# Patient Record
Sex: MALE | Race: BLACK OR AFRICAN AMERICAN | NOT HISPANIC OR LATINO | URBAN - METROPOLITAN AREA
[De-identification: names, ages, dates, MRNs, and addresses within clinical notes are randomized per-mention and may not be internally consistent; named-entity substitution may affect disease eponyms.]

---

## 2018-07-17 ENCOUNTER — INPATIENT (INPATIENT)
Facility: HOSPITAL | Age: 58
LOS: 6 days | Discharge: ANOTHER HOSPITAL | DRG: 917 | End: 2018-07-24
Attending: HOSPITALIST | Admitting: INTERNAL MEDICINE
Payer: OTHER GOVERNMENT

## 2018-07-17 VITALS
HEART RATE: 103 BPM | HEIGHT: 73 IN | OXYGEN SATURATION: 96 % | WEIGHT: 250 LBS | DIASTOLIC BLOOD PRESSURE: 44 MMHG | SYSTOLIC BLOOD PRESSURE: 78 MMHG | RESPIRATION RATE: 24 BRPM

## 2018-07-17 LAB
BASOPHILS # BLD AUTO: 0 K/UL — SIGNIFICANT CHANGE UP (ref 0–0.2)
BASOPHILS NFR BLD AUTO: 0.4 % — SIGNIFICANT CHANGE UP (ref 0–2)
EOSINOPHIL # BLD AUTO: 0.2 K/UL — SIGNIFICANT CHANGE UP (ref 0–0.5)
EOSINOPHIL NFR BLD AUTO: 1.5 % — SIGNIFICANT CHANGE UP (ref 0–6)
HCT VFR BLD CALC: 43.8 % — SIGNIFICANT CHANGE UP (ref 42–52)
HGB BLD-MCNC: 15 G/DL — SIGNIFICANT CHANGE UP (ref 14–18)
LYMPHOCYTES # BLD AUTO: 2.5 K/UL — SIGNIFICANT CHANGE UP (ref 1–4.8)
LYMPHOCYTES # BLD AUTO: 24.3 % — SIGNIFICANT CHANGE UP (ref 20–55)
MCHC RBC-ENTMCNC: 29.2 PG — SIGNIFICANT CHANGE UP (ref 27–31)
MCHC RBC-ENTMCNC: 34.2 G/DL — SIGNIFICANT CHANGE UP (ref 32–36)
MCV RBC AUTO: 85.4 FL — SIGNIFICANT CHANGE UP (ref 80–94)
MONOCYTES # BLD AUTO: 0.8 K/UL — SIGNIFICANT CHANGE UP (ref 0–0.8)
MONOCYTES NFR BLD AUTO: 8.2 % — SIGNIFICANT CHANGE UP (ref 3–10)
NEUTROPHILS # BLD AUTO: 6.8 K/UL — SIGNIFICANT CHANGE UP (ref 1.8–8)
NEUTROPHILS NFR BLD AUTO: 65.1 % — SIGNIFICANT CHANGE UP (ref 37–73)
PLATELET # BLD AUTO: 182 K/UL — SIGNIFICANT CHANGE UP (ref 150–400)
RBC # BLD: 5.13 M/UL — SIGNIFICANT CHANGE UP (ref 4.6–6.2)
RBC # FLD: 12.8 % — SIGNIFICANT CHANGE UP (ref 11–15.6)
WBC # BLD: 10.4 K/UL — SIGNIFICANT CHANGE UP (ref 4.8–10.8)
WBC # FLD AUTO: 10.4 K/UL — SIGNIFICANT CHANGE UP (ref 4.8–10.8)

## 2018-07-17 PROCEDURE — 36556 INSERT NON-TUNNEL CV CATH: CPT

## 2018-07-17 PROCEDURE — 99291 CRITICAL CARE FIRST HOUR: CPT | Mod: 25

## 2018-07-17 PROCEDURE — 99053 MED SERV 10PM-8AM 24 HR FAC: CPT

## 2018-07-17 PROCEDURE — 62270 DX LMBR SPI PNXR: CPT

## 2018-07-17 PROCEDURE — 99292 CRITICAL CARE ADDL 30 MIN: CPT | Mod: 25

## 2018-07-17 PROCEDURE — 93010 ELECTROCARDIOGRAM REPORT: CPT

## 2018-07-17 RX ORDER — SODIUM CHLORIDE 9 MG/ML
2000 INJECTION INTRAMUSCULAR; INTRAVENOUS; SUBCUTANEOUS ONCE
Qty: 0 | Refills: 0 | Status: COMPLETED | OUTPATIENT
Start: 2018-07-17 | End: 2018-07-17

## 2018-07-17 RX ORDER — NOREPINEPHRINE BITARTRATE/D5W 8 MG/250ML
0.05 PLASTIC BAG, INJECTION (ML) INTRAVENOUS
Qty: 8 | Refills: 0 | Status: DISCONTINUED | OUTPATIENT
Start: 2018-07-17 | End: 2018-07-19

## 2018-07-17 RX ORDER — CALCIUM CHLORIDE
1000 POWDER (GRAM) MISCELLANEOUS ONCE
Qty: 0 | Refills: 0 | Status: COMPLETED | OUTPATIENT
Start: 2018-07-17 | End: 2018-07-17

## 2018-07-17 RX ORDER — GLUCAGON INJECTION, SOLUTION 0.5 MG/.1ML
1 INJECTION, SOLUTION SUBCUTANEOUS ONCE
Qty: 0 | Refills: 0 | Status: COMPLETED | OUTPATIENT
Start: 2018-07-17 | End: 2018-07-17

## 2018-07-17 RX ADMIN — Medication 1000 MILLIGRAM(S): at 23:41

## 2018-07-17 RX ADMIN — GLUCAGON INJECTION, SOLUTION 1 MILLIGRAM(S): 0.5 INJECTION, SOLUTION SUBCUTANEOUS at 23:46

## 2018-07-17 RX ADMIN — Medication 10.63 MICROGRAM(S)/KG/MIN: at 23:53

## 2018-07-17 RX ADMIN — SODIUM CHLORIDE 4000 MILLILITER(S): 9 INJECTION INTRAMUSCULAR; INTRAVENOUS; SUBCUTANEOUS at 23:41

## 2018-07-17 NOTE — ED PROVIDER NOTE - CONSTITUTIONAL, MLM
normal... Patient is ill appearing, decreased mentation, slow to respond, arousalable to verbal stimuli.

## 2018-07-17 NOTE — ED PROVIDER NOTE - CARE PLAN
Principal Discharge DX:	Calcium channel blocker overdose  Secondary Diagnosis:	Shock  Secondary Diagnosis:	Hypotension

## 2018-07-17 NOTE — ED PROVIDER NOTE - MUSCULOSKELETAL, MLM
Spine appears normal, range of motion is not limited, no muscle or joint tenderness. Moves all extremities but slowly.

## 2018-07-17 NOTE — ED ADULT TRIAGE NOTE - CHIEF COMPLAINT QUOTE
"I took 2x dozen of my angina pills" "I wanted to hurt myself", BIBA c/o intentional overdose, per EMS pt was found outside leaning against his car. RN observes pt to be hypotensive on arrival, awake and talking. MD Goodwin called to bedside with code team "I took 2x dozen of my angina pills" "I wanted to hurt myself", BIBA c/o intentional overdose, per EMS pt was found outside leaning against his car, + suicide attempt per pt. Arrives with 15L/min NRB in place by EMS, RN observes pt to be hypotensive on arrival, awake and talking. Skin pale and diaphoretic, MD Goodwin called to bedside with code team. Name of medication taken unknown @ this time

## 2018-07-17 NOTE — ED PROVIDER NOTE - OBJECTIVE STATEMENT
57 y/o M presents to ED c/o sudden onset of suicidal attempt and overdose today while at home. Currently in ED patient is admitting to taking half a bottle of Glipizide and 2 bottles (approximately 30 pills in each) of his Amlodipine. Patient currently states he feels like he cannot breath.     As per wife, before the patient took the pills, they were having a verbal altercation. Upon walking downstairs, she noticed he was slumped over in the chair and was not as responsive as he normally is. At home, he admitted to taking "a couple dozen" of his prescribed medications and that he did so to harm himself, so the wife activated EMS. At home, the patient has access to Metformin, Glipizide, Lisinopril and Amlodipine. The patient does not have a history of psychiatric problems, or past suicide attempts.

## 2018-07-17 NOTE — ED PROVIDER NOTE - UNABLE TO OBTAIN
History is limited due to suspected overdose Severe Illness/Injury Cannot obtain complete ROS due to suspected overdose

## 2018-07-17 NOTE — ED PROVIDER NOTE - PROGRESS NOTE DETAILS
Spoke with toxicology fellow, following their current recommendations. EMS arrived with patient's medications that they retrieved from the home and had the following:   Isosorbide mononitrate, 1 empty bottle which was filled on 7/21/17, 3 month supply, Metformin bottles (several unopened, half filled and empty bottles), Glipizide 10, Ibuprofen, Lipitor, Several bottles of probiotics and vitamins, Aspirin 81, Magnesium EMS arrived with patient's medications that they retrieved from the home and had the following:   Isosorbide mononitrate (1 empty bottle which was filled on 7/21/17, 3 month supply), Metformin (several bottles, some unopened, half filled and empty), Glipizide 10, Ibuprofen, Lipitor, several bottles of probiotics and vitamins, Aspirin 81 and Magnesium. Although the patient did not have Amlodipine in the bag the wife states "I am 2000% sure that he has a prescription for Amlodipine". Patient also admits for a second time that he did take a significant amount of the Amlodipine. Toxicology fellow (Yasmany Estrada) gave the following recommendation:  100 unit regular insulin IV push, 100 units insulin drip per hour, 2 amp push of D50 (prior to insulin), D10 half normal at 140 per hour, titrate sugar q15 minutes to 100 Attempted to decrease Levophed drip to 0.05 mcg/kg/min, systolic BP was 80, will keep at 1 mcg/kg/min Repeat finger stick 391. Although the patient did not have Amlodipine in the bag the wife states "I am 2000% sure that he has a prescription for Amlodipine". Patient also admits for a second time that he did take a significant amount of the Amlodipine (approximately 50 pills). All recommendations implemented from toxicology fellow. ICU coming down to ED to evaluated patient. BP improving on Levophed and insulin drip, will continue to monitor closely.

## 2018-07-18 DIAGNOSIS — T38.3X1A POISONING BY INSULIN AND ORAL HYPOGLYCEMIC [ANTIDIABETIC] DRUGS, ACCIDENTAL (UNINTENTIONAL), INITIAL ENCOUNTER: ICD-10-CM

## 2018-07-18 DIAGNOSIS — T14.91XA SUICIDE ATTEMPT, INITIAL ENCOUNTER: ICD-10-CM

## 2018-07-18 DIAGNOSIS — T46.1X1A POISONING BY CALCIUM-CHANNEL BLOCKERS, ACCIDENTAL (UNINTENTIONAL), INITIAL ENCOUNTER: ICD-10-CM

## 2018-07-18 DIAGNOSIS — N17.9 ACUTE KIDNEY FAILURE, UNSPECIFIED: ICD-10-CM

## 2018-07-18 DIAGNOSIS — F32.9 MAJOR DEPRESSIVE DISORDER, SINGLE EPISODE, UNSPECIFIED: ICD-10-CM

## 2018-07-18 DIAGNOSIS — I10 ESSENTIAL (PRIMARY) HYPERTENSION: ICD-10-CM

## 2018-07-18 DIAGNOSIS — E11.9 TYPE 2 DIABETES MELLITUS WITHOUT COMPLICATIONS: ICD-10-CM

## 2018-07-18 DIAGNOSIS — R57.9 SHOCK, UNSPECIFIED: ICD-10-CM

## 2018-07-18 LAB
ALBUMIN SERPL ELPH-MCNC: 2.9 G/DL — LOW (ref 3.3–5.2)
ALBUMIN SERPL ELPH-MCNC: 3.1 G/DL — LOW (ref 3.3–5.2)
ALBUMIN SERPL ELPH-MCNC: 3.1 G/DL — LOW (ref 3.3–5.2)
ALBUMIN SERPL ELPH-MCNC: 3.6 G/DL — SIGNIFICANT CHANGE UP (ref 3.3–5.2)
ALBUMIN SERPL ELPH-MCNC: 3.9 G/DL — SIGNIFICANT CHANGE UP (ref 3.3–5.2)
ALP SERPL-CCNC: 36 U/L — LOW (ref 40–120)
ALP SERPL-CCNC: 37 U/L — LOW (ref 40–120)
ALP SERPL-CCNC: 37 U/L — LOW (ref 40–120)
ALP SERPL-CCNC: 50 U/L — SIGNIFICANT CHANGE UP (ref 40–120)
ALP SERPL-CCNC: 61 U/L — SIGNIFICANT CHANGE UP (ref 40–120)
ALT FLD-CCNC: 27 U/L — SIGNIFICANT CHANGE UP
ALT FLD-CCNC: 31 U/L — SIGNIFICANT CHANGE UP
ALT FLD-CCNC: 32 U/L — SIGNIFICANT CHANGE UP
ALT FLD-CCNC: 32 U/L — SIGNIFICANT CHANGE UP
ALT FLD-CCNC: 36 U/L — SIGNIFICANT CHANGE UP
ANION GAP SERPL CALC-SCNC: 13 MMOL/L — SIGNIFICANT CHANGE UP (ref 5–17)
ANION GAP SERPL CALC-SCNC: 14 MMOL/L — SIGNIFICANT CHANGE UP (ref 5–17)
ANION GAP SERPL CALC-SCNC: 16 MMOL/L — SIGNIFICANT CHANGE UP (ref 5–17)
ANION GAP SERPL CALC-SCNC: 18 MMOL/L — HIGH (ref 5–17)
ANION GAP SERPL CALC-SCNC: 22 MMOL/L — HIGH (ref 5–17)
APAP SERPL-MCNC: <7.5 UG/ML — LOW (ref 10–26)
AST SERPL-CCNC: 28 U/L — SIGNIFICANT CHANGE UP
AST SERPL-CCNC: 30 U/L — SIGNIFICANT CHANGE UP
AST SERPL-CCNC: 31 U/L — SIGNIFICANT CHANGE UP
AST SERPL-CCNC: 34 U/L — SIGNIFICANT CHANGE UP
AST SERPL-CCNC: 34 U/L — SIGNIFICANT CHANGE UP
BILIRUB SERPL-MCNC: 0.9 MG/DL — SIGNIFICANT CHANGE UP (ref 0.4–2)
BILIRUB SERPL-MCNC: 1 MG/DL — SIGNIFICANT CHANGE UP (ref 0.4–2)
BILIRUB SERPL-MCNC: 1 MG/DL — SIGNIFICANT CHANGE UP (ref 0.4–2)
BUN SERPL-MCNC: 15 MG/DL — SIGNIFICANT CHANGE UP (ref 8–20)
BUN SERPL-MCNC: 17 MG/DL — SIGNIFICANT CHANGE UP (ref 8–20)
BUN SERPL-MCNC: 17 MG/DL — SIGNIFICANT CHANGE UP (ref 8–20)
BUN SERPL-MCNC: 19 MG/DL — SIGNIFICANT CHANGE UP (ref 8–20)
BUN SERPL-MCNC: 22 MG/DL — HIGH (ref 8–20)
CALCIUM SERPL-MCNC: 7.1 MG/DL — LOW (ref 8.6–10.2)
CALCIUM SERPL-MCNC: 7.7 MG/DL — LOW (ref 8.6–10.2)
CALCIUM SERPL-MCNC: 7.8 MG/DL — LOW (ref 8.6–10.2)
CALCIUM SERPL-MCNC: 9.1 MG/DL — SIGNIFICANT CHANGE UP (ref 8.6–10.2)
CALCIUM SERPL-MCNC: 9.5 MG/DL — SIGNIFICANT CHANGE UP (ref 8.6–10.2)
CHLORIDE SERPL-SCNC: 100 MMOL/L — SIGNIFICANT CHANGE UP (ref 98–107)
CHLORIDE SERPL-SCNC: 103 MMOL/L — SIGNIFICANT CHANGE UP (ref 98–107)
CHLORIDE SERPL-SCNC: 103 MMOL/L — SIGNIFICANT CHANGE UP (ref 98–107)
CHLORIDE SERPL-SCNC: 105 MMOL/L — SIGNIFICANT CHANGE UP (ref 98–107)
CHLORIDE SERPL-SCNC: 109 MMOL/L — HIGH (ref 98–107)
CK MB CFR SERPL CALC: 5.8 NG/ML — SIGNIFICANT CHANGE UP (ref 0–6.7)
CK SERPL-CCNC: 669 U/L — HIGH (ref 30–200)
CO2 SERPL-SCNC: 17 MMOL/L — LOW (ref 22–29)
CO2 SERPL-SCNC: 19 MMOL/L — LOW (ref 22–29)
CO2 SERPL-SCNC: 20 MMOL/L — LOW (ref 22–29)
CO2 SERPL-SCNC: 20 MMOL/L — LOW (ref 22–29)
CO2 SERPL-SCNC: 21 MMOL/L — LOW (ref 22–29)
CREAT SERPL-MCNC: 1.15 MG/DL — SIGNIFICANT CHANGE UP (ref 0.5–1.3)
CREAT SERPL-MCNC: 1.23 MG/DL — SIGNIFICANT CHANGE UP (ref 0.5–1.3)
CREAT SERPL-MCNC: 1.28 MG/DL — SIGNIFICANT CHANGE UP (ref 0.5–1.3)
CREAT SERPL-MCNC: 1.4 MG/DL — HIGH (ref 0.5–1.3)
CREAT SERPL-MCNC: 1.87 MG/DL — HIGH (ref 0.5–1.3)
ETHANOL SERPL-MCNC: <10 MG/DL — SIGNIFICANT CHANGE UP
GAS PNL BLDA: SIGNIFICANT CHANGE UP
GLUCOSE BLDC GLUCOMTR-MCNC: 101 MG/DL — HIGH (ref 70–99)
GLUCOSE BLDC GLUCOMTR-MCNC: 101 MG/DL — HIGH (ref 70–99)
GLUCOSE BLDC GLUCOMTR-MCNC: 103 MG/DL — HIGH (ref 70–99)
GLUCOSE BLDC GLUCOMTR-MCNC: 104 MG/DL — HIGH (ref 70–99)
GLUCOSE BLDC GLUCOMTR-MCNC: 106 MG/DL — HIGH (ref 70–99)
GLUCOSE BLDC GLUCOMTR-MCNC: 107 MG/DL — HIGH (ref 70–99)
GLUCOSE BLDC GLUCOMTR-MCNC: 111 MG/DL — HIGH (ref 70–99)
GLUCOSE BLDC GLUCOMTR-MCNC: 112 MG/DL — HIGH (ref 70–99)
GLUCOSE BLDC GLUCOMTR-MCNC: 112 MG/DL — HIGH (ref 70–99)
GLUCOSE BLDC GLUCOMTR-MCNC: 119 MG/DL — HIGH (ref 70–99)
GLUCOSE BLDC GLUCOMTR-MCNC: 121 MG/DL — HIGH (ref 70–99)
GLUCOSE BLDC GLUCOMTR-MCNC: 122 MG/DL — HIGH (ref 70–99)
GLUCOSE BLDC GLUCOMTR-MCNC: 127 MG/DL — HIGH (ref 70–99)
GLUCOSE BLDC GLUCOMTR-MCNC: 128 MG/DL — HIGH (ref 70–99)
GLUCOSE BLDC GLUCOMTR-MCNC: 129 MG/DL — HIGH (ref 70–99)
GLUCOSE BLDC GLUCOMTR-MCNC: 129 MG/DL — HIGH (ref 70–99)
GLUCOSE BLDC GLUCOMTR-MCNC: 136 MG/DL — HIGH (ref 70–99)
GLUCOSE BLDC GLUCOMTR-MCNC: 137 MG/DL — HIGH (ref 70–99)
GLUCOSE BLDC GLUCOMTR-MCNC: 141 MG/DL — HIGH (ref 70–99)
GLUCOSE BLDC GLUCOMTR-MCNC: 146 MG/DL — HIGH (ref 70–99)
GLUCOSE BLDC GLUCOMTR-MCNC: 147 MG/DL — HIGH (ref 70–99)
GLUCOSE BLDC GLUCOMTR-MCNC: 149 MG/DL — HIGH (ref 70–99)
GLUCOSE BLDC GLUCOMTR-MCNC: 151 MG/DL — HIGH (ref 70–99)
GLUCOSE BLDC GLUCOMTR-MCNC: 155 MG/DL — HIGH (ref 70–99)
GLUCOSE BLDC GLUCOMTR-MCNC: 183 MG/DL — HIGH (ref 70–99)
GLUCOSE BLDC GLUCOMTR-MCNC: 198 MG/DL — HIGH (ref 70–99)
GLUCOSE BLDC GLUCOMTR-MCNC: 200 MG/DL — HIGH (ref 70–99)
GLUCOSE BLDC GLUCOMTR-MCNC: 221 MG/DL — HIGH (ref 70–99)
GLUCOSE BLDC GLUCOMTR-MCNC: 229 MG/DL — HIGH (ref 70–99)
GLUCOSE BLDC GLUCOMTR-MCNC: 297 MG/DL — HIGH (ref 70–99)
GLUCOSE BLDC GLUCOMTR-MCNC: 309 MG/DL — HIGH (ref 70–99)
GLUCOSE BLDC GLUCOMTR-MCNC: 34 MG/DL — CRITICAL LOW (ref 70–99)
GLUCOSE BLDC GLUCOMTR-MCNC: 41 MG/DL — CRITICAL LOW (ref 70–99)
GLUCOSE BLDC GLUCOMTR-MCNC: 56 MG/DL — LOW (ref 70–99)
GLUCOSE BLDC GLUCOMTR-MCNC: 63 MG/DL — LOW (ref 70–99)
GLUCOSE BLDC GLUCOMTR-MCNC: 75 MG/DL — SIGNIFICANT CHANGE UP (ref 70–99)
GLUCOSE BLDC GLUCOMTR-MCNC: 76 MG/DL — SIGNIFICANT CHANGE UP (ref 70–99)
GLUCOSE BLDC GLUCOMTR-MCNC: 81 MG/DL — SIGNIFICANT CHANGE UP (ref 70–99)
GLUCOSE BLDC GLUCOMTR-MCNC: 81 MG/DL — SIGNIFICANT CHANGE UP (ref 70–99)
GLUCOSE BLDC GLUCOMTR-MCNC: 83 MG/DL — SIGNIFICANT CHANGE UP (ref 70–99)
GLUCOSE BLDC GLUCOMTR-MCNC: 85 MG/DL — SIGNIFICANT CHANGE UP (ref 70–99)
GLUCOSE BLDC GLUCOMTR-MCNC: 86 MG/DL — SIGNIFICANT CHANGE UP (ref 70–99)
GLUCOSE BLDC GLUCOMTR-MCNC: 88 MG/DL — SIGNIFICANT CHANGE UP (ref 70–99)
GLUCOSE BLDC GLUCOMTR-MCNC: 97 MG/DL — SIGNIFICANT CHANGE UP (ref 70–99)
GLUCOSE BLDC GLUCOMTR-MCNC: 98 MG/DL — SIGNIFICANT CHANGE UP (ref 70–99)
GLUCOSE BLDC GLUCOMTR-MCNC: <30 MG/DL — CRITICAL LOW (ref 70–99)
GLUCOSE BLDC GLUCOMTR-MCNC: >530 MG/DL — CRITICAL HIGH (ref 70–99)
GLUCOSE SERPL-MCNC: 130 MG/DL — HIGH (ref 70–115)
GLUCOSE SERPL-MCNC: 132 MG/DL — HIGH (ref 70–115)
GLUCOSE SERPL-MCNC: 229 MG/DL — HIGH (ref 70–115)
GLUCOSE SERPL-MCNC: 274 MG/DL — HIGH (ref 70–115)
GLUCOSE SERPL-MCNC: 63 MG/DL — LOW (ref 70–115)
HCT VFR BLD CALC: 39 % — LOW (ref 42–52)
HGB BLD-MCNC: 13.4 G/DL — LOW (ref 14–18)
INR BLD: 1.15 RATIO — SIGNIFICANT CHANGE UP (ref 0.88–1.16)
LACTATE BLDV-MCNC: 1.7 MMOL/L — SIGNIFICANT CHANGE UP (ref 0.5–2)
LACTATE BLDV-MCNC: 1.8 MMOL/L — SIGNIFICANT CHANGE UP (ref 0.5–2)
LACTATE BLDV-MCNC: 2.5 MMOL/L — HIGH (ref 0.5–2)
LACTATE BLDV-MCNC: 2.5 MMOL/L — HIGH (ref 0.5–2)
LACTATE SERPL-SCNC: 6 MMOL/L — CRITICAL HIGH (ref 0.5–2)
MAGNESIUM SERPL-MCNC: 1.6 MG/DL — SIGNIFICANT CHANGE UP (ref 1.6–2.6)
MAGNESIUM SERPL-MCNC: 1.8 MG/DL — SIGNIFICANT CHANGE UP (ref 1.6–2.6)
MAGNESIUM SERPL-MCNC: 2.3 MG/DL — SIGNIFICANT CHANGE UP (ref 1.6–2.6)
MAGNESIUM SERPL-MCNC: 2.4 MG/DL — SIGNIFICANT CHANGE UP (ref 1.6–2.6)
MCHC RBC-ENTMCNC: 28.9 PG — SIGNIFICANT CHANGE UP (ref 27–31)
MCHC RBC-ENTMCNC: 34.4 G/DL — SIGNIFICANT CHANGE UP (ref 32–36)
MCV RBC AUTO: 84.2 FL — SIGNIFICANT CHANGE UP (ref 80–94)
PHOSPHATE SERPL-MCNC: 0.9 MG/DL — CRITICAL LOW (ref 2.4–4.7)
PHOSPHATE SERPL-MCNC: 2.6 MG/DL — SIGNIFICANT CHANGE UP (ref 2.4–4.7)
PHOSPHATE SERPL-MCNC: 3.4 MG/DL — SIGNIFICANT CHANGE UP (ref 2.4–4.7)
PHOSPHATE SERPL-MCNC: 3.4 MG/DL — SIGNIFICANT CHANGE UP (ref 2.4–4.7)
PLATELET # BLD AUTO: 208 K/UL — SIGNIFICANT CHANGE UP (ref 150–400)
POTASSIUM SERPL-MCNC: 3 MMOL/L — LOW (ref 3.5–5.3)
POTASSIUM SERPL-MCNC: 3.1 MMOL/L — LOW (ref 3.5–5.3)
POTASSIUM SERPL-MCNC: 3.3 MMOL/L — LOW (ref 3.5–5.3)
POTASSIUM SERPL-MCNC: 3.6 MMOL/L — SIGNIFICANT CHANGE UP (ref 3.5–5.3)
POTASSIUM SERPL-MCNC: 3.9 MMOL/L — SIGNIFICANT CHANGE UP (ref 3.5–5.3)
POTASSIUM SERPL-SCNC: 3 MMOL/L — LOW (ref 3.5–5.3)
POTASSIUM SERPL-SCNC: 3.1 MMOL/L — LOW (ref 3.5–5.3)
POTASSIUM SERPL-SCNC: 3.3 MMOL/L — LOW (ref 3.5–5.3)
POTASSIUM SERPL-SCNC: 3.6 MMOL/L — SIGNIFICANT CHANGE UP (ref 3.5–5.3)
POTASSIUM SERPL-SCNC: 3.9 MMOL/L — SIGNIFICANT CHANGE UP (ref 3.5–5.3)
PROT SERPL-MCNC: 5 G/DL — LOW (ref 6.6–8.7)
PROT SERPL-MCNC: 5.3 G/DL — LOW (ref 6.6–8.7)
PROT SERPL-MCNC: 5.4 G/DL — LOW (ref 6.6–8.7)
PROT SERPL-MCNC: 6.3 G/DL — LOW (ref 6.6–8.7)
PROT SERPL-MCNC: 6.9 G/DL — SIGNIFICANT CHANGE UP (ref 6.6–8.7)
PROTHROM AB SERPL-ACNC: 12.7 SEC — SIGNIFICANT CHANGE UP (ref 9.8–12.7)
RBC # BLD: 4.63 M/UL — SIGNIFICANT CHANGE UP (ref 4.6–6.2)
RBC # FLD: 12.7 % — SIGNIFICANT CHANGE UP (ref 11–15.6)
SALICYLATES SERPL-MCNC: <0.6 MG/DL — LOW (ref 10–20)
SODIUM SERPL-SCNC: 138 MMOL/L — SIGNIFICANT CHANGE UP (ref 135–145)
SODIUM SERPL-SCNC: 139 MMOL/L — SIGNIFICANT CHANGE UP (ref 135–145)
SODIUM SERPL-SCNC: 139 MMOL/L — SIGNIFICANT CHANGE UP (ref 135–145)
SODIUM SERPL-SCNC: 142 MMOL/L — SIGNIFICANT CHANGE UP (ref 135–145)
SODIUM SERPL-SCNC: 142 MMOL/L — SIGNIFICANT CHANGE UP (ref 135–145)
TSH SERPL-MCNC: 3.81 UIU/ML — SIGNIFICANT CHANGE UP (ref 0.27–4.2)
WBC # BLD: 17.1 K/UL — HIGH (ref 4.8–10.8)
WBC # FLD AUTO: 17.1 K/UL — HIGH (ref 4.8–10.8)

## 2018-07-18 PROCEDURE — 71045 X-RAY EXAM CHEST 1 VIEW: CPT | Mod: 26

## 2018-07-18 PROCEDURE — 99291 CRITICAL CARE FIRST HOUR: CPT

## 2018-07-18 PROCEDURE — 99223 1ST HOSP IP/OBS HIGH 75: CPT

## 2018-07-18 RX ORDER — INSULIN HUMAN 100 [IU]/ML
100 INJECTION, SOLUTION SUBCUTANEOUS
Qty: 100 | Refills: 0 | Status: DISCONTINUED | OUTPATIENT
Start: 2018-07-18 | End: 2018-07-18

## 2018-07-18 RX ORDER — ISOSORBIDE MONONITRATE 60 MG/1
30 TABLET, EXTENDED RELEASE ORAL
Qty: 0 | Refills: 0 | COMMUNITY

## 2018-07-18 RX ORDER — ASPIRIN/CALCIUM CARB/MAGNESIUM 324 MG
1 TABLET ORAL
Qty: 0 | Refills: 0 | COMMUNITY

## 2018-07-18 RX ORDER — DEXTROSE 50 % IN WATER 50 %
25 SYRINGE (ML) INTRAVENOUS ONCE
Qty: 0 | Refills: 0 | Status: COMPLETED | OUTPATIENT
Start: 2018-07-18 | End: 2018-07-18

## 2018-07-18 RX ORDER — METFORMIN HYDROCHLORIDE 850 MG/1
0 TABLET ORAL
Qty: 0 | Refills: 0 | COMMUNITY

## 2018-07-18 RX ORDER — POTASSIUM PHOSPHATE, MONOBASIC POTASSIUM PHOSPHATE, DIBASIC 236; 224 MG/ML; MG/ML
30 INJECTION, SOLUTION INTRAVENOUS ONCE
Qty: 0 | Refills: 0 | Status: COMPLETED | OUTPATIENT
Start: 2018-07-18 | End: 2018-07-18

## 2018-07-18 RX ORDER — ERGOCALCIFEROL 1.25 MG/1
1 CAPSULE ORAL
Qty: 0 | Refills: 0 | COMMUNITY

## 2018-07-18 RX ORDER — DEXTROSE 50 % IN WATER 50 %
50 SYRINGE (ML) INTRAVENOUS ONCE
Qty: 0 | Refills: 0 | Status: COMPLETED | OUTPATIENT
Start: 2018-07-18 | End: 2018-07-18

## 2018-07-18 RX ORDER — ACETAMINOPHEN 500 MG
650 TABLET ORAL ONCE
Qty: 0 | Refills: 0 | Status: COMPLETED | OUTPATIENT
Start: 2018-07-18 | End: 2018-07-18

## 2018-07-18 RX ORDER — ATORVASTATIN CALCIUM 80 MG/1
1 TABLET, FILM COATED ORAL
Qty: 0 | Refills: 0 | COMMUNITY

## 2018-07-18 RX ORDER — INSULIN HUMAN 100 [IU]/ML
100 INJECTION, SOLUTION SUBCUTANEOUS ONCE
Qty: 0 | Refills: 0 | Status: COMPLETED | OUTPATIENT
Start: 2018-07-18 | End: 2018-07-18

## 2018-07-18 RX ORDER — INSULIN HUMAN 100 [IU]/ML
50 INJECTION, SOLUTION SUBCUTANEOUS
Qty: 250 | Refills: 0 | Status: DISCONTINUED | OUTPATIENT
Start: 2018-07-18 | End: 2018-07-19

## 2018-07-18 RX ORDER — MAGNESIUM SULFATE 500 MG/ML
2 VIAL (ML) INJECTION
Qty: 0 | Refills: 0 | Status: COMPLETED | OUTPATIENT
Start: 2018-07-18 | End: 2018-07-18

## 2018-07-18 RX ORDER — HEPARIN SODIUM 5000 [USP'U]/ML
5000 INJECTION INTRAVENOUS; SUBCUTANEOUS EVERY 8 HOURS
Qty: 0 | Refills: 0 | Status: DISCONTINUED | OUTPATIENT
Start: 2018-07-18 | End: 2018-07-24

## 2018-07-18 RX ORDER — SODIUM CHLORIDE 9 MG/ML
1000 INJECTION, SOLUTION INTRAVENOUS
Qty: 0 | Refills: 0 | Status: DISCONTINUED | OUTPATIENT
Start: 2018-07-18 | End: 2018-07-19

## 2018-07-18 RX ORDER — MENTHOL AND METHYL SALICYLATE 10; 30 G/100G; G/100G
1 STICK TOPICAL THREE TIMES A DAY
Qty: 0 | Refills: 0 | Status: DISCONTINUED | OUTPATIENT
Start: 2018-07-18 | End: 2018-07-24

## 2018-07-18 RX ORDER — SODIUM CHLORIDE 9 MG/ML
1000 INJECTION, SOLUTION INTRAVENOUS ONCE
Qty: 0 | Refills: 0 | Status: COMPLETED | OUTPATIENT
Start: 2018-07-18 | End: 2018-07-18

## 2018-07-18 RX ORDER — VASOPRESSIN 20 [USP'U]/ML
0.04 INJECTION INTRAVENOUS
Qty: 100 | Refills: 0 | Status: DISCONTINUED | OUTPATIENT
Start: 2018-07-18 | End: 2018-07-19

## 2018-07-18 RX ADMIN — Medication 650 MILLIGRAM(S): at 20:47

## 2018-07-18 RX ADMIN — INSULIN HUMAN 100 UNIT(S): 100 INJECTION, SOLUTION SUBCUTANEOUS at 00:42

## 2018-07-18 RX ADMIN — Medication 85 MILLIMOLE(S): at 12:44

## 2018-07-18 RX ADMIN — VASOPRESSIN 2.4 UNIT(S)/MIN: 20 INJECTION INTRAVENOUS at 03:15

## 2018-07-18 RX ADMIN — Medication 25 MILLILITER(S): at 09:00

## 2018-07-18 RX ADMIN — Medication 50 GRAM(S): at 15:52

## 2018-07-18 RX ADMIN — Medication 25 MILLILITER(S): at 12:30

## 2018-07-18 RX ADMIN — INSULIN HUMAN 100 UNIT(S)/HR: 100 INJECTION, SOLUTION SUBCUTANEOUS at 03:30

## 2018-07-18 RX ADMIN — INSULIN HUMAN 50 UNIT(S)/HR: 100 INJECTION, SOLUTION SUBCUTANEOUS at 23:46

## 2018-07-18 RX ADMIN — INSULIN HUMAN 50 UNIT(S)/HR: 100 INJECTION, SOLUTION SUBCUTANEOUS at 17:56

## 2018-07-18 RX ADMIN — Medication 50 MILLILITER(S): at 00:35

## 2018-07-18 RX ADMIN — Medication 50 MILLILITER(S): at 14:10

## 2018-07-18 RX ADMIN — SODIUM CHLORIDE 140 MILLILITER(S): 9 INJECTION, SOLUTION INTRAVENOUS at 00:40

## 2018-07-18 RX ADMIN — Medication 10.63 MICROGRAM(S)/KG/MIN: at 05:00

## 2018-07-18 RX ADMIN — POTASSIUM PHOSPHATE, MONOBASIC POTASSIUM PHOSPHATE, DIBASIC 83.33 MILLIMOLE(S): 236; 224 INJECTION, SOLUTION INTRAVENOUS at 07:01

## 2018-07-18 RX ADMIN — SODIUM CHLORIDE 160 MILLILITER(S): 9 INJECTION, SOLUTION INTRAVENOUS at 06:39

## 2018-07-18 RX ADMIN — SODIUM CHLORIDE 180 MILLILITER(S): 9 INJECTION, SOLUTION INTRAVENOUS at 08:35

## 2018-07-18 RX ADMIN — MENTHOL AND METHYL SALICYLATE 1 APPLICATION(S): 10; 30 STICK TOPICAL at 22:27

## 2018-07-18 RX ADMIN — SODIUM CHLORIDE 1333.33 MILLILITER(S): 9 INJECTION, SOLUTION INTRAVENOUS at 02:30

## 2018-07-18 RX ADMIN — Medication 50 GRAM(S): at 17:56

## 2018-07-18 RX ADMIN — SODIUM CHLORIDE 120 MILLILITER(S): 9 INJECTION, SOLUTION INTRAVENOUS at 06:10

## 2018-07-18 RX ADMIN — Medication 650 MILLIGRAM(S): at 20:35

## 2018-07-18 RX ADMIN — Medication 50 MILLILITER(S): at 07:30

## 2018-07-18 RX ADMIN — INSULIN HUMAN 100 UNIT(S)/HR: 100 INJECTION, SOLUTION SUBCUTANEOUS at 00:44

## 2018-07-18 RX ADMIN — Medication 10.63 MICROGRAM(S)/KG/MIN: at 03:00

## 2018-07-18 RX ADMIN — VASOPRESSIN 2.4 UNIT(S)/MIN: 20 INJECTION INTRAVENOUS at 05:00

## 2018-07-18 RX ADMIN — Medication 50 MILLILITER(S): at 00:30

## 2018-07-18 RX ADMIN — HEPARIN SODIUM 5000 UNIT(S): 5000 INJECTION INTRAVENOUS; SUBCUTANEOUS at 22:27

## 2018-07-18 RX ADMIN — HEPARIN SODIUM 5000 UNIT(S): 5000 INJECTION INTRAVENOUS; SUBCUTANEOUS at 06:58

## 2018-07-18 RX ADMIN — HEPARIN SODIUM 5000 UNIT(S): 5000 INJECTION INTRAVENOUS; SUBCUTANEOUS at 14:00

## 2018-07-18 NOTE — ED ADULT NURSE REASSESSMENT NOTE - NS ED NURSE REASSESS COMMENT FT1
MD at bedside, inserting right IJ. Patient alert and verbally responsive, able to make needs known, answers questions.

## 2018-07-18 NOTE — ED ADULT NURSE NOTE - OBJECTIVE STATEMENT
Code Team 2 called for patient at critical care but Dr. Goodwin evaluated patient and instructed to move patient to resus room, code team cancelled. Patient alert and oriented x 3, able to make needs known. Presents with hypotension, wife at bedside. Patient  admitted to taking half a bottle of glipizide, and 2 bottles (30 tabs each) of amlodipine. Patient states he can't breathe but o2 sat is 97% room air. Patient admits he wants to end his life. Patient had a verbal altercation with wife earlier.

## 2018-07-18 NOTE — BEHAVIORAL HEALTH ASSESSMENT NOTE - NSBHCHARTREVIEWVS_PSY_A_CORE FT
Vital Signs Last 24 Hrs  T(C): 37.2 (18 Jul 2018 16:00), Max: 37.6 (18 Jul 2018 12:00)  T(F): 98.9 (18 Jul 2018 16:00), Max: 99.6 (18 Jul 2018 12:00)  HR: 96 (18 Jul 2018 16:30) (65 - 103)  BP: 105/53 (18 Jul 2018 16:30) (68/43 - 128/58)  BP(mean): 75 (18 Jul 2018 16:30) (53 - 84)  RR: 26 (18 Jul 2018 16:30) (16 - 27)  SpO2: 94% (18 Jul 2018 16:30) (93% - 100%)

## 2018-07-18 NOTE — CONSULT NOTE ADULT - ASSESSMENT
1) Toxic ATN  2) Oliguria  3) CCB overdose;metformin overdose  4) Acidosis  5) Rhabdo    Pt to c/w insulin infusion and D10 per toxicology  Mental status improved; awake ; alert; oriented;  Acidosis improving  Making urine  Agree with MICU and would hold off on HD at this time ; if any clinical changes will reconsider  Discussed in detail with MICU; Dr. Arboleda  Will monitor closely  Labs q4-6h

## 2018-07-18 NOTE — ED PROCEDURE NOTE - CPROC ED ARTER LINE DETAIL1
Connected to a pressurized flush line./Line was sutured in place./Positive blood return was obtained via the catheter./Using sterile technique, the correct location was identified, and a needle was inserted into the artery (specify in FT).

## 2018-07-18 NOTE — BEHAVIORAL HEALTH ASSESSMENT NOTE - NSBHCHARTREVIEWLAB_PSY_A_CORE FT
13.4   17.1  )-----------( 208      ( 18 Jul 2018 05:58 )             39.0     07-18    142  |  103  |  22.0<H>  ----------------------------<  132<H>  3.6   |  17.0<L>  |  1.87<H>    Ca    9.1      18 Jul 2018 05:58  Phos  0.9     07-18  Mg     1.6     07-18    TPro  6.3<L>  /  Alb  3.6  /  TBili  0.9  /  DBili  x   /  AST  34  /  ALT  32  /  AlkPhos  50  07-18    LIVER FUNCTIONS - ( 18 Jul 2018 05:58 )  Alb: 3.6 g/dL / Pro: 6.3 g/dL / ALK PHOS: 50 U/L / ALT: 32 U/L / AST: 34 U/L / GGT: x           PT/INR - ( 18 Jul 2018 05:58 )   PT: 12.7 sec;   INR: 1.15 ratio

## 2018-07-18 NOTE — ED ADULT NURSE NOTE - CHIEF COMPLAINT QUOTE
"I took 2x dozen of my angina pills" "I wanted to hurt myself", BIBA c/o intentional overdose, per EMS pt was found outside leaning against his car, + suicide attempt per pt. Arrives with 15L/min NRB in place by EMS, RN observes pt to be hypotensive on arrival, awake and talking. Skin pale and diaphoretic, MD Goodwin called to bedside with code team. Name of medication taken unknown @ this time

## 2018-07-18 NOTE — H&P ADULT - PROBLEM SELECTOR PLAN 6
Currently on high dose insulin infusion. Will transition to ISS with accu-checks after patient is stabilized.

## 2018-07-18 NOTE — BEHAVIORAL HEALTH ASSESSMENT NOTE - SUMMARY
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  He is currently being treated in SICU. Asked to evaluate depressive sx's and possible suicidal ideation.  Patient reports stress related to impotence last 10-15 years,  and mourning death of his father 3 years ago. However, he was in usual state of health until 1 week ago when wife discovered sexually explicit pictures and videos that a woman had been sending him on his phone.  Patient reports that he has been having surreptitious communication with a woman for the last two years for sexual stimulation but that he had no interest in meeting person.  Prior to presentation he had argument with wife where she stated she was going to divorce him and leave.  Patient developed sudden suicidal ideation and took around 50 pills of imdur, metformin and amlodipine as a suicide attempt.  No psychotic or manic sx's were elicited.  Patient reports one week of depressed mood prior to incident.  He denied any other neurovegetative signs of depression.  He remained engaged in work, although had not been very communicative with wife.  Patient currently denies any S/H I/I/P, and remains future oriented.  He has spoken with wife and kids. 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  He is currently being treated in SICU. Asked to evaluate depressive sx's and possible suicidal ideation.  Patient reports stress related to impotence last 10-15 years,  and mourning death of his father 3 years ago. However, he was in usual state of health until 1 week ago when wife discovered sexually explicit pictures and videos that a woman had been sending him on his phone.  Patient reports that he has been having surreptitious communication with a woman for the last two years for sexual stimulation but that he had no interest in meeting person.  Prior to presentation he had argument with wife where she stated she was going to divorce him and leave.  Patient developed sudden suicidal ideation and took around 50 pills of imdur, metformin and amlodipine as a suicide attempt.  No psychotic or manic sx's were elicited.  Patient reports one week of depressed mood prior to incident.  He denied any other neurovegetative signs of depression.  He remained engaged in work, although had not been very communicative with wife.  Patient currently denies any S/H I/I/P, and remains future oriented.  He has spoken with wife and kids. He hopes to reconcile with wife and wants to re enage in treatment    Plan  1) Will obtain more collateral information from family to assess current family dynamics and safety. Note writer left message for wife (Adriana [phone]), Patient provided contact information for son (Canelo [phone]) and Ciroal ([phone]) and Chaya ([phone])  2)  Would not start medications at this time.  Will continue to assess need for medications  3) Patient is not currently psychiatrically cleared.  Will continue to follow for additional support, and disposition planning.

## 2018-07-18 NOTE — BEHAVIORAL HEALTH ASSESSMENT NOTE - OTHER PAST PSYCHIATRIC HISTORY (INCLUDE DETAILS REGARDING ONSET, COURSE OF ILLNESS, INPATIENT/OUTPATIENT TREATMENT)
[facility] 5-7 years (last time 3 years ago).  He started seeing psychiatrist related to some feeling of frustration and depression secondary to what he saw has his daughter's making poor choices in life.  Pt denies any prior history of formal psychiatric treatment.  He has no prior inpatient hospitalization.  No prior h/o psychotropic medications.  No prior suicide attempts.

## 2018-07-18 NOTE — BEHAVIORAL HEALTH ASSESSMENT NOTE - HPI (INCLUDE ILLNESS QUALITY, SEVERITY, DURATION, TIMING, CONTEXT, MODIFYING FACTORS, ASSOCIATED SIGNS AND SYMPTOMS)
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  He is currently being treated in SICU. Asked to evaluate depressive sx's and possible suicidal ideation.              Patient reports that he has been dealing with worsening impotence for the last 10-15 years secondary to his diabetes and HTN. Treatments such as Levitra have not been effective.  His wife knew that he would watch pornography.  However, patient reports that he communicated with a woman in [facility] for the last two  years who sent him sexually explicit photos and videos.  He would at times wire her money (50 dollars), and has communicated by phone with her. He denies that he ever met the woman but states he did the to increase his sexual stimulation. His wife found out about this for the first time when looking through his phone one week ago and since then she has been angry and upset.  Prior to this patient was in his usual state of mental health, although he did mention that death of his father 3 years ago was difficult.      For the last week patient reports relationship with his wife has been stressful. She as found videos and explicit pictures and has showed them to his children. She feels that he has been cheating on her and trying to replace her, although patient insists he had never met the other woman and had no desire to do so.  He reports that during the last week his wife has been insulting him, calling him names like "Trump" and that he is exactly like his father who was a philander.  Patient had responded by stone walling and withdrawing which his usual response to stress, which has only resulted in his wife becoming more upset.  Prior to presentation, pt's wife had contacted  and requested a divorce, and was in process of taking a taxi to live somewhere else.  Patient states wife told him he was scum of the earth and did not care if he lived or .  At that point, patient without premeditation developed intense suicidal ideation and went into bathroom, and took "a lot of pills" with intention to end his life.  He took approximately 50 pills of imdur, metformin and amlodipine.  Patient did not tell anyone what he had done.  But wife started noticing his slurred speech and he started rambling and she called 911.  Patient reports that at first he wanted to die and did not want help.   Patient was brought to [facility] and is being treated in SICU.      Patient reports that for the last week he has been feeling depressed about situation. He wants to continue to stay with his wife.  He reports that there were no changes in his appetite, sleep or concentration and he continued to work on projects related to his [place] apartment which he found a distraction.  He denies any concentration difficulties and denies any suicidal ideation before his impulsive attempt.  Patient admits that he did not care if he lived or  at that point.  He is glad to currently be alive.  He is denying any current S/H I/I/P.  Patient reports that he has h/o occasional panic attacks and reports that he did have a panic attack in the prior week.          Concerning other psychiatric symptoms, pt deniesany aggressive or violent behavior towards others. Pt denies any episodes of bizarre happiness, unusual energy, unusual nightime excitation or other common symptoms of annalise. Pt denies hearing voices or seeing things.  No delusions were elicited.  He denies any substance use. He has been engaging in appropriate social conversation with nursing staff.  He has not made any suicidal statements while in units.       Patient reports that he is sad about situation.  He knows what happened is his responsibility. He has resolved to no longer communicate with woman in [facility]. He has spoken to wife and kids since being in the hospital.  He reports his wife is still very upset, but he hopes to reconcile.  He reports that his kids and wife are very important to him. He wants to be around to see his grandchildren.  At this point he denying that he would do such a thing again.  [facility].

## 2018-07-18 NOTE — H&P ADULT - PROBLEM SELECTOR PLAN 1
S/p 2L IVF bolus, CaCl, glucagon. Continue insulin infusion @ 100u/hr with D10+1/2NS @ 100cc/hr, titrating D10 gtt to keep -200. q 6 hour BMP's. Monitor potassium level closely given high dose insulin. [facility]IS [facility] toxicology team input appreciated.

## 2018-07-18 NOTE — BEHAVIORAL HEALTH ASSESSMENT NOTE - NSBHREFERDETAILS_PSY_A_CORE_FT
Asked to evaluate for possible suicidal ideation following deliberate overdose with multiple medications

## 2018-07-18 NOTE — H&P ADULT - PROBLEM SELECTOR PLAN 5
First time. Denies further suicidal ideation. 1-1 constant observation. Will consult psychiatry and social work.

## 2018-07-18 NOTE — H&P ADULT - ASSESSMENT
57 y/o M with a h/o HTN, DM, with CCB/Imdur/metformin overdose, shock, MIRZA.      Case discussed in detail with eICU attending, Dr. Moss.      Total critical care time spent on encounter: 49 mins

## 2018-07-18 NOTE — H&P ADULT - NEGATIVE NEUROLOGICAL SYMPTOMS
no headache/no confusion/no tremors/no facial palsy/no loss of sensation/no difficulty walking/no hemiparesis/no generalized seizures/no focal seizures/no syncope/no vertigo/no weakness/no loss of consciousness/no transient paralysis/no paresthesias

## 2018-07-18 NOTE — H&P ADULT - ATTENDING COMMENTS
I have seen and evaluated the patient and agree with the assessment and plan with the following additions:    CCB, imdur, metformin overdose - intensional, insulin gtt, vasopressors, IVF, follow UOP, nephro consult, psych cnsult, one to one, serial labs    CCT 45 min

## 2018-07-18 NOTE — CONSULT NOTE ADULT - SUBJECTIVE AND OBJECTIVE BOX
[facility] DIVISION OF KIDNEY DISEASES AND HYPERTENSION -- INITIAL CONSULT NOTE  --------------------------------------------------------------------------------  HPI:  58 year old male with HTN, DM, presenting with suicide attempt after taking 50 pills of amlodipine, imdur and metformin. Pt was initially hypotensive; given 2L IVF; IV CaCl, glucagon and 1U/KG insulin bolus followed by 100U/hr of insulin per toxicology, started on levophed. Pt did initially have lactate of 5.7. Patient is AAO x 3; awake; alert; oriented at this time. Making approximately 40 cc of urine/hr per colmenares. Lying in bed in NAD; feels somewhat foggy however able to present full history. Nephrology consulted for overdose and possible need for renal replacement therapy.    PAST HISTORY  --------------------------------------------------------------------------------  PAST MEDICAL & SURGICAL HISTORY:  HTN (hypertension)  DM (diabetes mellitus)  No significant past surgical history    FAMILY HISTORY:  No pertinent family history in first degree relatives    PAST SOCIAL HISTORY:    ALLERGIES & MEDICATIONS  --------------------------------------------------------------------------------  Allergies    No Known Allergies    Intolerances      Standing Inpatient Medications  dextrose 10% + sodium chloride 0.45%. 1000 milliLiter(s) IV Continuous <Continuous>  heparin  Injectable 5000 Unit(s) SubCutaneous every 8 hours  insulin Infusion 100 Unit(s)/Hr IV Continuous <Continuous>  norepinephrine Infusion 0.05 MICROgram(s)/kG/Min IV Continuous <Continuous>  sodium phosphate IVPB 30 milliMole(s) IV Intermittent once  vasopressin Infusion 0.04 Unit(s)/Min IV Continuous <Continuous>    PRN Inpatient Medications      REVIEW OF SYSTEMS  --------------------------------------------------------------------------------  Gen: No weight changes, fatigue, fevers/chills, weakness  Skin: No rashes  Head/Eyes/Ears/Mouth: No headache; Normal hearing; Normal vision w/o blurriness; No sinus pain/discomfort, sore throat  Respiratory: No dyspnea, cough, wheezing, hemoptysis  CV: No chest pain, PND, orthopnea  GI: No abdominal pain, diarrhea, constipation, nausea, vomiting, melena, hematochezia  : No increased frequency, dysuria, hematuria, nocturia  MSK: No joint pain/swelling; no back pain; no edema  Neuro: No dizziness/lightheadedness, weakness, seizures, numbness, tingling  Heme: No easy bruising or bleeding  Endo: No heat/cold intolerance  Psych: No significant nervousness, anxiety, stress, depression    All other systems were reviewed and are negative, except as noted.    VITALS/PHYSICAL EXAM  --------------------------------------------------------------------------------  T(C): 37.5 (07-18-18 @ 08:00), Max: 37.5 (07-18-18 @ 08:00)  HR: 83 (07-18-18 @ 11:00) (65 - 103)  BP: 117/58 (07-18-18 @ 11:00) (68/43 - 122/61)  RR: 23 (07-18-18 @ 11:00) (16 - 27)  SpO2: 94% (07-18-18 @ 11:00) (93% - 100%)  Wt(kg): --  Height (cm): 185.42 (07-18-18 @ 02:09)  Weight (kg): 128 (07-18-18 @ 02:09)  BMI (kg/m2): 37.2 (07-18-18 @ 02:09)  BSA (m2): 2.49 (07-18-18 @ 02:09)      07-17-18 @ 07:01  -  07-18-18 @ 07:00  --------------------------------------------------------  IN: 3537.6 mL / OUT: 950 mL / NET: 2587.6 mL    07-18-18 @ 07:01  -  07-18-18 @ 12:11  --------------------------------------------------------  IN: 2587 mL / OUT: 190 mL / NET: 2397 mL      Physical Exam:  	Gen: NAD,  	HEENT: PERRL, supple neck, clear oropharynx  	Pulm: CTA B/L  	CV: RRR, S1S2; no rub  	Back: No spinal or CVA tenderness; no sacral edema  	Abd: +BS, soft, nontender/nondistended  	: colmenares+  	UE: Warm, FROM, no clubbing, intact strength; no edema; no asterixis  	LE: Warm, FROM, no clubbing, intact strength; no edema  	Neuro: No focal deficits, intact gait  	Psych: Normal affect and mood  	Skin: Warm, without rashes      LABS/STUDIES  --------------------------------------------------------------------------------              13.4   17.1  >-----------<  208      [07-18-18 @ 05:58]              39.0     142  |  103  |  22.0  ----------------------------<  132      [07-18-18 @ 05:58]  3.6   |  17.0  |  1.87        Ca     9.1     [07-18-18 @ 05:58]      Mg     1.8     [07-18-18 @ 05:58]      Phos  0.9     [07-18-18 @ 05:58]    TPro  6.3  /  Alb  3.6  /  TBili  0.9  /  DBili  x   /  AST  34  /  ALT  32  /  AlkPhos  50  [07-18-18 @ 05:58]    PT/INR: PT 12.7 , INR 1.15       [07-18-18 @ 05:58]          [07-17-18 @ 23:48]    Creatinine Trend:  SCr 1.87 [07-18 @ 05:58]  SCr 1.40 [07-17 @ 23:48]        TSH 3.81      [07-17-18 @ 23:48]

## 2018-07-18 NOTE — ED PROCEDURE NOTE - CPROC ED INFUS LINE DETAIL1
The guidewire was recovered./The location was identified, and the area was draped and prepped./The catheter was placed using sterile technique./All lumen(s) aspirated and flushed without difficulty./Ultrasound guidance was used during placement.

## 2018-07-18 NOTE — H&P ADULT - PROBLEM SELECTOR PLAN 3
Lactate: 5.7. Probably more related to hypoperfusion in the setting of profound shock. Will continue to trend. Monitor renal function.

## 2018-07-18 NOTE — ED PROCEDURE NOTE - PROCEDURE NAME, MLM
Arterial Puncture/Cannulation Pt was here yesterday sp fall with R wrist pain  Had x ray done  radiology read showed questionable fracture  Pt was notified and is returning today for volar splint placement and given ortho's number to follow up

## 2018-07-18 NOTE — H&P ADULT - PROBLEM SELECTOR PLAN 4
Likely ATN secondary to hypoperfusion during shock state. Continue IVF. Trend BUN/Cr. Monitor lytes and UOP. Avoid nephrotoxic agents. No indications for urgent hemodialysis at this point in time.

## 2018-07-18 NOTE — BEHAVIORAL HEALTH ASSESSMENT NOTE - RISK ASSESSMENT
Moderate-Given recent serious suicide attempt, ongoing stressor with wife, depressed mood, not currently in treatment. At this time he denies current S/H I/I/P, reports he is glad to be alive, is future oriented, denies any substance use, denies global insomnia,  no psychotic sx's were elicited.

## 2018-07-18 NOTE — H&P ADULT - HISTORY OF PRESENT ILLNESS
57 y/o M with a h/o HTN, DM, presents to ED after taking a handful of about 50 pills (amlodipine, Imdur, metformin). He did this after having an argument with his wife- she reports that he told her that she won't have to see him anymore and he began slurring his speech. In the ED the patient was noted to be hypotensive (SBP: 70's). HR stable. He was given 2L IVF bolus, IV CaCl, glucagon, a 1u/kg insulin bolus followed by a 100u/hr insulin infusion, and ultimately started on a Levophed infusion. Lactate: 5.7. The toxicology team @ [facility] was consulted. The patient is A&Ox3 and is requesting to see his wife and daughter. He denies further suicidal ideation. His wife reports that ever since the patient's father  about 1 year ago he has been upset, but has never done anything like this before. [facility] but has not been formally diagnosed with any psychiatric issues.

## 2018-07-18 NOTE — H&P ADULT - PROBLEM SELECTOR PLAN 2
Secondary to CCB and Imdur overdose. Continue Levophed, titrating to maintain a MAP > 65. Dose had escalated very quickly between time of transport and arrival at ICU. Will add fixed dose vasopressin for added support. HR remains in the 90's. Additional 1L IVF bolus given as well. Lactate: 5.7. Likely more related to hypoperfusion at this point than metformin OD. Will trend until it clears.

## 2018-07-19 DIAGNOSIS — R41.82 ALTERED MENTAL STATUS, UNSPECIFIED: ICD-10-CM

## 2018-07-19 DIAGNOSIS — T50.901A POISONING BY UNSPECIFIED DRUGS, MEDICAMENTS AND BIOLOGICAL SUBSTANCES, ACCIDENTAL (UNINTENTIONAL), INITIAL ENCOUNTER: ICD-10-CM

## 2018-07-19 DIAGNOSIS — E87.6 HYPOKALEMIA: ICD-10-CM

## 2018-07-19 LAB
ALBUMIN SERPL ELPH-MCNC: 3.4 G/DL — SIGNIFICANT CHANGE UP (ref 3.3–5.2)
ALP SERPL-CCNC: 40 U/L — SIGNIFICANT CHANGE UP (ref 40–120)
ALT FLD-CCNC: 34 U/L — SIGNIFICANT CHANGE UP
AMPHET UR-MCNC: NEGATIVE — SIGNIFICANT CHANGE UP
ANION GAP SERPL CALC-SCNC: 12 MMOL/L — SIGNIFICANT CHANGE UP (ref 5–17)
ANION GAP SERPL CALC-SCNC: 14 MMOL/L — SIGNIFICANT CHANGE UP (ref 5–17)
APPEARANCE UR: CLEAR — SIGNIFICANT CHANGE UP
AST SERPL-CCNC: 35 U/L — SIGNIFICANT CHANGE UP
BACTERIA # UR AUTO: ABNORMAL
BARBITURATES UR SCN-MCNC: NEGATIVE — SIGNIFICANT CHANGE UP
BENZODIAZ UR-MCNC: NEGATIVE — SIGNIFICANT CHANGE UP
BILIRUB SERPL-MCNC: 1.2 MG/DL — SIGNIFICANT CHANGE UP (ref 0.4–2)
BILIRUB UR-MCNC: NEGATIVE — SIGNIFICANT CHANGE UP
BUN SERPL-MCNC: 12 MG/DL — SIGNIFICANT CHANGE UP (ref 8–20)
BUN SERPL-MCNC: 19 MG/DL — SIGNIFICANT CHANGE UP (ref 8–20)
CALCIUM SERPL-MCNC: 7.9 MG/DL — LOW (ref 8.6–10.2)
CALCIUM SERPL-MCNC: 8.8 MG/DL — SIGNIFICANT CHANGE UP (ref 8.6–10.2)
CHLORIDE SERPL-SCNC: 104 MMOL/L — SIGNIFICANT CHANGE UP (ref 98–107)
CHLORIDE SERPL-SCNC: 108 MMOL/L — HIGH (ref 98–107)
CO2 SERPL-SCNC: 22 MMOL/L — SIGNIFICANT CHANGE UP (ref 22–29)
CO2 SERPL-SCNC: 23 MMOL/L — SIGNIFICANT CHANGE UP (ref 22–29)
COCAINE METAB.OTHER UR-MCNC: NEGATIVE — SIGNIFICANT CHANGE UP
COLOR SPEC: YELLOW — SIGNIFICANT CHANGE UP
CREAT SERPL-MCNC: 1.09 MG/DL — SIGNIFICANT CHANGE UP (ref 0.5–1.3)
CREAT SERPL-MCNC: 1.16 MG/DL — SIGNIFICANT CHANGE UP (ref 0.5–1.3)
DIFF PNL FLD: ABNORMAL
EPI CELLS # UR: SIGNIFICANT CHANGE UP
GAS PNL BLDA: SIGNIFICANT CHANGE UP
GLUCOSE BLDC GLUCOMTR-MCNC: 100 MG/DL — HIGH (ref 70–99)
GLUCOSE BLDC GLUCOMTR-MCNC: 210 MG/DL — HIGH (ref 70–99)
GLUCOSE BLDC GLUCOMTR-MCNC: 237 MG/DL — HIGH (ref 70–99)
GLUCOSE BLDC GLUCOMTR-MCNC: 259 MG/DL — HIGH (ref 70–99)
GLUCOSE BLDC GLUCOMTR-MCNC: 285 MG/DL — HIGH (ref 70–99)
GLUCOSE BLDC GLUCOMTR-MCNC: 289 MG/DL — HIGH (ref 70–99)
GLUCOSE BLDC GLUCOMTR-MCNC: 67 MG/DL — LOW (ref 70–99)
GLUCOSE BLDC GLUCOMTR-MCNC: 72 MG/DL — SIGNIFICANT CHANGE UP (ref 70–99)
GLUCOSE BLDC GLUCOMTR-MCNC: 80 MG/DL — SIGNIFICANT CHANGE UP (ref 70–99)
GLUCOSE BLDC GLUCOMTR-MCNC: 81 MG/DL — SIGNIFICANT CHANGE UP (ref 70–99)
GLUCOSE BLDC GLUCOMTR-MCNC: 91 MG/DL — SIGNIFICANT CHANGE UP (ref 70–99)
GLUCOSE BLDC GLUCOMTR-MCNC: 96 MG/DL — SIGNIFICANT CHANGE UP (ref 70–99)
GLUCOSE SERPL-MCNC: 255 MG/DL — HIGH (ref 70–115)
GLUCOSE SERPL-MCNC: 98 MG/DL — SIGNIFICANT CHANGE UP (ref 70–115)
GLUCOSE UR QL: 250 MG/DL
HCT VFR BLD CALC: 34.4 % — LOW (ref 42–52)
HCT VFR BLD CALC: 35.9 % — LOW (ref 42–52)
HGB BLD-MCNC: 11.7 G/DL — LOW (ref 14–18)
HGB BLD-MCNC: 12.3 G/DL — LOW (ref 14–18)
KETONES UR-MCNC: NEGATIVE — SIGNIFICANT CHANGE UP
LACTATE BLDV-MCNC: 1.7 MMOL/L — SIGNIFICANT CHANGE UP (ref 0.5–2)
LEUKOCYTE ESTERASE UR-ACNC: NEGATIVE — SIGNIFICANT CHANGE UP
MAGNESIUM SERPL-MCNC: 2.4 MG/DL — SIGNIFICANT CHANGE UP (ref 1.6–2.6)
MCHC RBC-ENTMCNC: 28.9 PG — SIGNIFICANT CHANGE UP (ref 27–31)
MCHC RBC-ENTMCNC: 29.3 PG — SIGNIFICANT CHANGE UP (ref 27–31)
MCHC RBC-ENTMCNC: 34 G/DL — SIGNIFICANT CHANGE UP (ref 32–36)
MCHC RBC-ENTMCNC: 34.3 G/DL — SIGNIFICANT CHANGE UP (ref 32–36)
MCV RBC AUTO: 84.9 FL — SIGNIFICANT CHANGE UP (ref 80–94)
MCV RBC AUTO: 85.5 FL — SIGNIFICANT CHANGE UP (ref 80–94)
METHADONE UR-MCNC: NEGATIVE — SIGNIFICANT CHANGE UP
NITRITE UR-MCNC: NEGATIVE — SIGNIFICANT CHANGE UP
OPIATES UR-MCNC: NEGATIVE — SIGNIFICANT CHANGE UP
PCP SPEC-MCNC: SIGNIFICANT CHANGE UP
PCP UR-MCNC: NEGATIVE — SIGNIFICANT CHANGE UP
PH UR: 6.5 — SIGNIFICANT CHANGE UP (ref 5–8)
PHOSPHATE SERPL-MCNC: 4 MG/DL — SIGNIFICANT CHANGE UP (ref 2.4–4.7)
PLATELET # BLD AUTO: 107 K/UL — LOW (ref 150–400)
PLATELET # BLD AUTO: 107 K/UL — LOW (ref 150–400)
POTASSIUM SERPL-MCNC: 4.2 MMOL/L — SIGNIFICANT CHANGE UP (ref 3.5–5.3)
POTASSIUM SERPL-MCNC: 4.6 MMOL/L — SIGNIFICANT CHANGE UP (ref 3.5–5.3)
POTASSIUM SERPL-SCNC: 4.2 MMOL/L — SIGNIFICANT CHANGE UP (ref 3.5–5.3)
POTASSIUM SERPL-SCNC: 4.6 MMOL/L — SIGNIFICANT CHANGE UP (ref 3.5–5.3)
PROT SERPL-MCNC: 5.8 G/DL — LOW (ref 6.6–8.7)
PROT UR-MCNC: NEGATIVE MG/DL — SIGNIFICANT CHANGE UP
RBC # BLD: 4.05 M/UL — LOW (ref 4.6–6.2)
RBC # BLD: 4.2 M/UL — LOW (ref 4.6–6.2)
RBC # FLD: 12.7 % — SIGNIFICANT CHANGE UP (ref 11–15.6)
RBC # FLD: 12.9 % — SIGNIFICANT CHANGE UP (ref 11–15.6)
RBC CASTS # UR COMP ASSIST: ABNORMAL /HPF (ref 0–4)
SODIUM SERPL-SCNC: 141 MMOL/L — SIGNIFICANT CHANGE UP (ref 135–145)
SODIUM SERPL-SCNC: 142 MMOL/L — SIGNIFICANT CHANGE UP (ref 135–145)
SP GR SPEC: 1.01 — SIGNIFICANT CHANGE UP (ref 1.01–1.02)
THC UR QL: NEGATIVE — SIGNIFICANT CHANGE UP
UROBILINOGEN FLD QL: NEGATIVE MG/DL — SIGNIFICANT CHANGE UP
WBC # BLD: 10.4 K/UL — SIGNIFICANT CHANGE UP (ref 4.8–10.8)
WBC # BLD: 9.7 K/UL — SIGNIFICANT CHANGE UP (ref 4.8–10.8)
WBC # FLD AUTO: 10.4 K/UL — SIGNIFICANT CHANGE UP (ref 4.8–10.8)
WBC # FLD AUTO: 9.7 K/UL — SIGNIFICANT CHANGE UP (ref 4.8–10.8)
WBC UR QL: SIGNIFICANT CHANGE UP

## 2018-07-19 PROCEDURE — 99232 SBSQ HOSP IP/OBS MODERATE 35: CPT

## 2018-07-19 PROCEDURE — 99233 SBSQ HOSP IP/OBS HIGH 50: CPT

## 2018-07-19 RX ORDER — INSULIN LISPRO 100/ML
VIAL (ML) SUBCUTANEOUS
Qty: 0 | Refills: 0 | Status: DISCONTINUED | OUTPATIENT
Start: 2018-07-19 | End: 2018-07-24

## 2018-07-19 RX ORDER — SODIUM CHLORIDE 9 MG/ML
1000 INJECTION, SOLUTION INTRAVENOUS
Qty: 0 | Refills: 0 | Status: DISCONTINUED | OUTPATIENT
Start: 2018-07-19 | End: 2018-07-24

## 2018-07-19 RX ORDER — GLUCAGON INJECTION, SOLUTION 0.5 MG/.1ML
1 INJECTION, SOLUTION SUBCUTANEOUS ONCE
Qty: 0 | Refills: 0 | Status: DISCONTINUED | OUTPATIENT
Start: 2018-07-19 | End: 2018-07-24

## 2018-07-19 RX ORDER — DEXTROSE 50 % IN WATER 50 %
25 SYRINGE (ML) INTRAVENOUS ONCE
Qty: 0 | Refills: 0 | Status: DISCONTINUED | OUTPATIENT
Start: 2018-07-19 | End: 2018-07-24

## 2018-07-19 RX ORDER — POTASSIUM CHLORIDE 20 MEQ
20 PACKET (EA) ORAL
Qty: 0 | Refills: 0 | Status: COMPLETED | OUTPATIENT
Start: 2018-07-19 | End: 2018-07-19

## 2018-07-19 RX ORDER — POTASSIUM CHLORIDE 20 MEQ
20 PACKET (EA) ORAL ONCE
Qty: 0 | Refills: 0 | Status: COMPLETED | OUTPATIENT
Start: 2018-07-19 | End: 2018-07-19

## 2018-07-19 RX ORDER — ATORVASTATIN CALCIUM 80 MG/1
20 TABLET, FILM COATED ORAL AT BEDTIME
Qty: 0 | Refills: 0 | Status: DISCONTINUED | OUTPATIENT
Start: 2018-07-19 | End: 2018-07-24

## 2018-07-19 RX ORDER — DEXTROSE 50 % IN WATER 50 %
15 SYRINGE (ML) INTRAVENOUS ONCE
Qty: 0 | Refills: 0 | Status: DISCONTINUED | OUTPATIENT
Start: 2018-07-19 | End: 2018-07-24

## 2018-07-19 RX ORDER — ACETAMINOPHEN 500 MG
650 TABLET ORAL ONCE
Qty: 0 | Refills: 0 | Status: COMPLETED | OUTPATIENT
Start: 2018-07-19 | End: 2018-07-19

## 2018-07-19 RX ORDER — ERGOCALCIFEROL 1.25 MG/1
50000 CAPSULE ORAL
Qty: 0 | Refills: 0 | Status: DISCONTINUED | OUTPATIENT
Start: 2018-07-19 | End: 2018-07-24

## 2018-07-19 RX ORDER — SODIUM CHLORIDE 9 MG/ML
1000 INJECTION, SOLUTION INTRAVENOUS
Qty: 0 | Refills: 0 | Status: DISCONTINUED | OUTPATIENT
Start: 2018-07-19 | End: 2018-07-19

## 2018-07-19 RX ORDER — DEXTROSE 50 % IN WATER 50 %
12.5 SYRINGE (ML) INTRAVENOUS ONCE
Qty: 0 | Refills: 0 | Status: DISCONTINUED | OUTPATIENT
Start: 2018-07-19 | End: 2018-07-24

## 2018-07-19 RX ORDER — ASPIRIN/CALCIUM CARB/MAGNESIUM 324 MG
81 TABLET ORAL DAILY
Qty: 0 | Refills: 0 | Status: DISCONTINUED | OUTPATIENT
Start: 2018-07-19 | End: 2018-07-24

## 2018-07-19 RX ADMIN — HEPARIN SODIUM 5000 UNIT(S): 5000 INJECTION INTRAVENOUS; SUBCUTANEOUS at 06:41

## 2018-07-19 RX ADMIN — Medication 20 MILLIEQUIVALENT(S): at 01:46

## 2018-07-19 RX ADMIN — ERGOCALCIFEROL 50000 UNIT(S): 1.25 CAPSULE ORAL at 13:25

## 2018-07-19 RX ADMIN — MENTHOL AND METHYL SALICYLATE 1 APPLICATION(S): 10; 30 STICK TOPICAL at 06:41

## 2018-07-19 RX ADMIN — ATORVASTATIN CALCIUM 20 MILLIGRAM(S): 80 TABLET, FILM COATED ORAL at 21:02

## 2018-07-19 RX ADMIN — Medication 650 MILLIGRAM(S): at 21:02

## 2018-07-19 RX ADMIN — Medication 100 MILLIEQUIVALENT(S): at 01:47

## 2018-07-19 RX ADMIN — Medication 3: at 13:26

## 2018-07-19 RX ADMIN — MENTHOL AND METHYL SALICYLATE 1 APPLICATION(S): 10; 30 STICK TOPICAL at 13:26

## 2018-07-19 RX ADMIN — MENTHOL AND METHYL SALICYLATE 1 APPLICATION(S): 10; 30 STICK TOPICAL at 21:02

## 2018-07-19 RX ADMIN — Medication 100 MILLIEQUIVALENT(S): at 03:28

## 2018-07-19 RX ADMIN — HEPARIN SODIUM 5000 UNIT(S): 5000 INJECTION INTRAVENOUS; SUBCUTANEOUS at 21:02

## 2018-07-19 RX ADMIN — Medication 2: at 16:33

## 2018-07-19 RX ADMIN — HEPARIN SODIUM 5000 UNIT(S): 5000 INJECTION INTRAVENOUS; SUBCUTANEOUS at 13:26

## 2018-07-19 RX ADMIN — SODIUM CHLORIDE 180 MILLILITER(S): 9 INJECTION, SOLUTION INTRAVENOUS at 00:57

## 2018-07-19 NOTE — PROGRESS NOTE ADULT - PROBLEM SELECTOR PLAN 2
secondary to shock and drug OD, now resolve  continue to avoid neurosuppressants  ongoing neuro checks secondary to shock and drug OD, now resolved  continue to avoid neurosuppressants  ongoing neuro checks

## 2018-07-19 NOTE — PROGRESS NOTE ADULT - ASSESSMENT
1) Toxic ATN  2) Oliguria  3) CCB overdose;metformin overdose  4) Acidosis  5) Rhabdo    Insulin gtt discontinued  IVF D/C'd  Mental status improved; awake ; alert; oriented;  Acidosis improving  Making urine  Agree with MICU and would hold off on HD at this time ; if any clinical changes will reconsider  Discussed in detail with MICU; Dr. Roque today  Will monitor closely  Labs q4-6h

## 2018-07-19 NOTE — PROGRESS NOTE ADULT - PROBLEM SELECTOR PLAN 1
multi drug overdose   weaned off pressors, insulin drip is off   will remain on 1:1 observation  psych following multi drug overdose as attempt at suicide  weaned off pressors, insulin drip is off   will remain on 1:1 observation  psych following

## 2018-07-19 NOTE — PROGRESS NOTE ADULT - PROBLEM SELECTOR PLAN 5
continue to hold all oral hyperglycemics  glucose continue to hold all oral hyperglycemics  glucose rebounded, pt taking PO diet   d/c dextrose IVF continue to hold all oral hyperglycemics  glucose rebounded, pt taking PO diet   d/c dextrose IVF   change accuchecks to q 4 then AC/HS

## 2018-07-19 NOTE — PROGRESS NOTE ADULT - PROBLEM SELECTOR PLAN 3
now off pressors since early last evening  will discontinue insulin infusion now that shock has resolved  keep MAP>65  monitor urine output closely (goal >0.5cc/kg/hr)  will continue to hold all anti-HTN for now  AG has closed, lactate normal
resolved- off pressors  will continue to hold all anti-HTN for now  AG has closed, lactate normal

## 2018-07-19 NOTE — PROGRESS NOTE ADULT - SUBJECTIVE AND OBJECTIVE BOX
Patient is a 58y old  Male who presents with a chief complaint of CCB overdose, shock (18 Jul 2018 05:39)      BRIEF HOSPITAL COURSE: ***    Events last 24 hours: ***    PAST MEDICAL & SURGICAL HISTORY:  HTN (hypertension)  DM (diabetes mellitus)  No significant past surgical history        Medications:            aspirin enteric coated 81 milliGRAM(s) Oral daily  heparin  Injectable 5000 Unit(s) SubCutaneous every 8 hours        atorvastatin 20 milliGRAM(s) Oral at bedtime  dextrose 40% Gel 15 Gram(s) Oral once PRN  dextrose 50% Injectable 12.5 Gram(s) IV Push once  dextrose 50% Injectable 25 Gram(s) IV Push once  dextrose 50% Injectable 25 Gram(s) IV Push once  glucagon  Injectable 1 milliGRAM(s) IntraMuscular once PRN  insulin lispro (HumaLOG) corrective regimen sliding scale   SubCutaneous three times a day before meals    dextrose 5%. 1000 milliLiter(s) IV Continuous <Continuous>  ergocalciferol 72981 Unit(s) Oral every week      methyl salicylate 14%/menthol 6% Topical Ointment 1 Application(s) Topical three times a day            ICU Vital Signs Last 24 Hrs  T(C): 37.9 (19 Jul 2018 12:00), Max: 38.2 (18 Jul 2018 20:00)  T(F): 100.2 (19 Jul 2018 12:00), Max: 100.8 (18 Jul 2018 20:00)  HR: 102 (19 Jul 2018 15:00) (77 - 102)  BP: 133/62 (19 Jul 2018 15:00) (94/51 - 135/66)  BP(mean): 89 (19 Jul 2018 15:00) (54 - 90)  ABP: --  ABP(mean): --  RR: 34 (19 Jul 2018 15:00) (21 - 39)  SpO2: 95% (19 Jul 2018 15:00) (92% - 96%)      ABG - ( 19 Jul 2018 04:28 )  pH, Arterial: 7.46  pH, Blood: x     /  pCO2: 34    /  pO2: 61    / HCO3: 24    / Base Excess: 0.2   /  SaO2: 93                  I&O's Detail    18 Jul 2018 07:01  -  19 Jul 2018 07:00  --------------------------------------------------------  IN:    dextrose 10% + sodium chloride 0.45%: 3240 mL    dextrose 5% + sodium chloride 0.45%.: 75 mL    insulin Infusion: 350 mL    insulin Infusion: 1050 mL    norepinephrine Infusion: 862 mL    Oral Fluid: 480 mL    Solution: 1200 mL    vasopressin Infusion: 24 mL  Total IN: 7281 mL    OUT:    Indwelling Catheter - Urethral: 2580 mL  Total OUT: 2580 mL    Total NET: 4701 mL      19 Jul 2018 07:01  -  19 Jul 2018 15:45  --------------------------------------------------------  IN:    dextrose 5% + sodium chloride 0.45%.: 75 mL    Oral Fluid: 200 mL    Solution: 100 mL  Total IN: 375 mL    OUT:    Indwelling Catheter - Urethral: 1250 mL    Voided: 200 mL  Total OUT: 1450 mL    Total NET: -1075 mL            LABS:                        11.7   10.4  )-----------( 107      ( 19 Jul 2018 06:22 )             34.4     07-19    142  |  108<H>  |  12.0  ----------------------------<  98  4.2   |  22.0  |  1.09    Ca    7.9<L>      19 Jul 2018 06:18  Phos  4.0     07-19  Mg     2.4     07-19    TPro  5.8<L>  /  Alb  3.4  /  TBili  1.2  /  DBili  x   /  AST  35  /  ALT  34  /  AlkPhos  40  07-19      CARDIAC MARKERS ( 17 Jul 2018 23:48 )  x     / x     / 669 U/L / x     / 5.8 ng/mL      CAPILLARY BLOOD GLUCOSE      POCT Blood Glucose.: 289 mg/dL (19 Jul 2018 12:52)    PT/INR - ( 18 Jul 2018 05:58 )   PT: 12.7 sec;   INR: 1.15 ratio             CULTURES:      Physical Examination:    General: No acute distress.      HEENT: Pupils equal, reactive to light.  Symmetric.    PULM: Clear to auscultation bilaterally, no significant sputum production    NECK: Supple, no lymphadenopathy, trachea midline    CVS: Regular rate and rhythm, no murmurs, rubs, or gallops    GI: Soft, nondistended, nontender, normoactive bowel sounds, no masses    EXT: No edema, nontender    SKIN: Warm and well perfused, no rashes noted.    NEURO: Alert, oriented, interactive, nonfocal    DEVICES:     RADIOLOGY:  < from: Xray Chest 1 View AP/PA. (07.18.18 @ 05:09) >  FINDINGS /   IMPRESSION:     There is no acute consolidation.  There are no pleural effusion or   pneumothorax. Cardiac and mediastinal structures are within normal   limits.   There is a right jugular catheter tip in region of SVC. There   are degenerative changes.                 SHIRIN EL M.D., ATTENDING RADIOLOGIST  This document has been electronically signed. Jul 18 2018 11:04AM        < end of copied text >    CRITICAL CARE TIME SPENT: *** Patient is a 58y old  Male who presents with a chief complaint of CCB overdose, shock (18 Jul 2018 05:39)      BRIEF HOSPITAL COURSE:     Events last 24 hours:    PAST MEDICAL & SURGICAL HISTORY:  HTN (hypertension)  DM (diabetes mellitus)  No significant past surgical history        Medications:            aspirin enteric coated 81 milliGRAM(s) Oral daily  heparin  Injectable 5000 Unit(s) SubCutaneous every 8 hours        atorvastatin 20 milliGRAM(s) Oral at bedtime  dextrose 40% Gel 15 Gram(s) Oral once PRN  dextrose 50% Injectable 12.5 Gram(s) IV Push once  dextrose 50% Injectable 25 Gram(s) IV Push once  dextrose 50% Injectable 25 Gram(s) IV Push once  glucagon  Injectable 1 milliGRAM(s) IntraMuscular once PRN  insulin lispro (HumaLOG) corrective regimen sliding scale   SubCutaneous three times a day before meals    dextrose 5%. 1000 milliLiter(s) IV Continuous <Continuous>  ergocalciferol 39631 Unit(s) Oral every week      methyl salicylate 14%/menthol 6% Topical Ointment 1 Application(s) Topical three times a day            ICU Vital Signs Last 24 Hrs  T(C): 37.9 (19 Jul 2018 12:00), Max: 38.2 (18 Jul 2018 20:00)  T(F): 100.2 (19 Jul 2018 12:00), Max: 100.8 (18 Jul 2018 20:00)  HR: 102 (19 Jul 2018 15:00) (77 - 102)  BP: 133/62 (19 Jul 2018 15:00) (94/51 - 135/66)  BP(mean): 89 (19 Jul 2018 15:00) (54 - 90)  ABP: --  ABP(mean): --  RR: 34 (19 Jul 2018 15:00) (21 - 39)  SpO2: 95% (19 Jul 2018 15:00) (92% - 96%)      ABG - ( 19 Jul 2018 04:28 )  pH, Arterial: 7.46  pH, Blood: x     /  pCO2: 34    /  pO2: 61    / HCO3: 24    / Base Excess: 0.2   /  SaO2: 93                  I&O's Detail    18 Jul 2018 07:01  -  19 Jul 2018 07:00  --------------------------------------------------------  IN:    dextrose 10% + sodium chloride 0.45%: 3240 mL    dextrose 5% + sodium chloride 0.45%.: 75 mL    insulin Infusion: 350 mL    insulin Infusion: 1050 mL    norepinephrine Infusion: 862 mL    Oral Fluid: 480 mL    Solution: 1200 mL    vasopressin Infusion: 24 mL  Total IN: 7281 mL    OUT:    Indwelling Catheter - Urethral: 2580 mL  Total OUT: 2580 mL    Total NET: 4701 mL      19 Jul 2018 07:01  -  19 Jul 2018 15:45  --------------------------------------------------------  IN:    dextrose 5% + sodium chloride 0.45%.: 75 mL    Oral Fluid: 200 mL    Solution: 100 mL  Total IN: 375 mL    OUT:    Indwelling Catheter - Urethral: 1250 mL    Voided: 200 mL  Total OUT: 1450 mL    Total NET: -1075 mL            LABS:                        11.7   10.4  )-----------( 107      ( 19 Jul 2018 06:22 )             34.4     07-19    142  |  108<H>  |  12.0  ----------------------------<  98  4.2   |  22.0  |  1.09    Ca    7.9<L>      19 Jul 2018 06:18  Phos  4.0     07-19  Mg     2.4     07-19    TPro  5.8<L>  /  Alb  3.4  /  TBili  1.2  /  DBili  x   /  AST  35  /  ALT  34  /  AlkPhos  40  07-19      CARDIAC MARKERS ( 17 Jul 2018 23:48 )  x     / x     / 669 U/L / x     / 5.8 ng/mL      CAPILLARY BLOOD GLUCOSE      POCT Blood Glucose.: 289 mg/dL (19 Jul 2018 12:52)    PT/INR - ( 18 Jul 2018 05:58 )   PT: 12.7 sec;   INR: 1.15 ratio             CULTURES:      Physical Examination:    General: No acute distress.      HEENT: Pupils equal, reactive to light.  Symmetric.    PULM: Clear to auscultation bilaterally, no significant sputum production    NECK: Supple, no lymphadenopathy, trachea midline    CVS: Regular rate and rhythm, no murmurs, rubs, or gallops    GI: Soft, nondistended, nontender, normoactive bowel sounds, no masses    EXT: No edema, nontender    SKIN: Warm and well perfused, no rashes noted.    NEURO: Alert, oriented, interactive, nonfocal    DEVICES:     RADIOLOGY:  < from: Xray Chest 1 View AP/PA. (07.18.18 @ 05:09) >  FINDINGS /   IMPRESSION:     There is no acute consolidation.  There are no pleural effusion or   pneumothorax. Cardiac and mediastinal structures are within normal   limits.   There is a right jugular catheter tip in region of SVC. There   are degenerative changes.                 SHIRIN EL M.D., ATTENDING RADIOLOGIST  This document has been electronically signed. Jul 18 2018 11:04AM        < end of copied text >    CRITICAL CARE TIME SPENT: Patient is a 58y old  Male who presents with a chief complaint of CCB overdose, shock (18 Jul 2018 05:39)      BRIEF HOSPITAL COURSE: 58M with PMHx of HTN, DM who presented to ED earlier yesterday after taking a handful of pill (norvasc/imdur/metformin) following an argument with his wife.  Pt was found by wife slurring his speech with empty pill bottles.  Upon arrival to ED pt noted to be in shock with lactemia. Pt was given fluids, calcium, and glucagon with subsequent placement on insulin infusion and D10 drip after consult with toxicology.  Pt remained unresponsive to treatment and pressors were initiated (Levo and Vaso).  Pt was transferred to ICU for further management. Pt awoke and stabilized hemodynamically. Is remorseful but still stating he wishes he could "follow through" on what he started.    Events last 24 hours: off pressors, off insulin gtts    PAST MEDICAL & SURGICAL HISTORY:  HTN (hypertension)  DM (diabetes mellitus)  No significant past surgical history        Medications:            aspirin enteric coated 81 milliGRAM(s) Oral daily  heparin  Injectable 5000 Unit(s) SubCutaneous every 8 hours        atorvastatin 20 milliGRAM(s) Oral at bedtime  dextrose 40% Gel 15 Gram(s) Oral once PRN  dextrose 50% Injectable 12.5 Gram(s) IV Push once  dextrose 50% Injectable 25 Gram(s) IV Push once  dextrose 50% Injectable 25 Gram(s) IV Push once  glucagon  Injectable 1 milliGRAM(s) IntraMuscular once PRN  insulin lispro (HumaLOG) corrective regimen sliding scale   SubCutaneous three times a day before meals    dextrose 5%. 1000 milliLiter(s) IV Continuous <Continuous>  ergocalciferol 09013 Unit(s) Oral every week      methyl salicylate 14%/menthol 6% Topical Ointment 1 Application(s) Topical three times a day            ICU Vital Signs Last 24 Hrs  T(C): 37.9 (19 Jul 2018 12:00), Max: 38.2 (18 Jul 2018 20:00)  T(F): 100.2 (19 Jul 2018 12:00), Max: 100.8 (18 Jul 2018 20:00)  HR: 102 (19 Jul 2018 15:00) (77 - 102)  BP: 133/62 (19 Jul 2018 15:00) (94/51 - 135/66)  BP(mean): 89 (19 Jul 2018 15:00) (54 - 90)  ABP: --  ABP(mean): --  RR: 34 (19 Jul 2018 15:00) (21 - 39)  SpO2: 95% (19 Jul 2018 15:00) (92% - 96%)      ABG - ( 19 Jul 2018 04:28 )  pH, Arterial: 7.46  pH, Blood: x     /  pCO2: 34    /  pO2: 61    / HCO3: 24    / Base Excess: 0.2   /  SaO2: 93                  I&O's Detail    18 Jul 2018 07:01  -  19 Jul 2018 07:00  --------------------------------------------------------  IN:    dextrose 10% + sodium chloride 0.45%: 3240 mL    dextrose 5% + sodium chloride 0.45%.: 75 mL    insulin Infusion: 350 mL    insulin Infusion: 1050 mL    norepinephrine Infusion: 862 mL    Oral Fluid: 480 mL    Solution: 1200 mL    vasopressin Infusion: 24 mL  Total IN: 7281 mL    OUT:    Indwelling Catheter - Urethral: 2580 mL  Total OUT: 2580 mL    Total NET: 4701 mL      19 Jul 2018 07:01  -  19 Jul 2018 15:45  --------------------------------------------------------  IN:    dextrose 5% + sodium chloride 0.45%.: 75 mL    Oral Fluid: 200 mL    Solution: 100 mL  Total IN: 375 mL    OUT:    Indwelling Catheter - Urethral: 1250 mL    Voided: 200 mL  Total OUT: 1450 mL    Total NET: -1075 mL            LABS:                        11.7   10.4  )-----------( 107      ( 19 Jul 2018 06:22 )             34.4     07-19    142  |  108<H>  |  12.0  ----------------------------<  98  4.2   |  22.0  |  1.09    Ca    7.9<L>      19 Jul 2018 06:18  Phos  4.0     07-19  Mg     2.4     07-19    TPro  5.8<L>  /  Alb  3.4  /  TBili  1.2  /  DBili  x   /  AST  35  /  ALT  34  /  AlkPhos  40  07-19      CARDIAC MARKERS ( 17 Jul 2018 23:48 )  x     / x     / 669 U/L / x     / 5.8 ng/mL      CAPILLARY BLOOD GLUCOSE      POCT Blood Glucose.: 289 mg/dL (19 Jul 2018 12:52)    PT/INR - ( 18 Jul 2018 05:58 )   PT: 12.7 sec;   INR: 1.15 ratio             CULTURES:      Physical Examination:    General: No acute distress.      HEENT: Pupils equal, reactive to light.  Symmetric.    PULM: Clear to auscultation bilaterally, no significant sputum production    NECK: Supple, no lymphadenopathy, trachea midline    CVS: Regular rate and rhythm, no murmurs, rubs, or gallops    GI: Soft, nondistended, nontender, normoactive bowel sounds, no masses    EXT: No edema, nontender    SKIN: Warm and well perfused, no rashes noted.    NEURO: Alert, oriented, interactive, nonfocal    DEVICES:     RADIOLOGY:  < from: Xray Chest 1 View AP/PA. (07.18.18 @ 05:09) >  FINDINGS /   IMPRESSION:     There is no acute consolidation.  There are no pleural effusion or   pneumothorax. Cardiac and mediastinal structures are within normal   limits.   There is a right jugular catheter tip in region of SVC. There   are degenerative changes.                 SHIRIN EL M.D., ATTENDING RADIOLOGIST  This document has been electronically signed. Jul 18 2018 11:04AM        < end of copied text >    CRITICAL CARE TIME SPENT:

## 2018-07-19 NOTE — PROGRESS NOTE ADULT - ASSESSMENT
58M with PMHx of HTN, DM with multi-drug overdose (CCB/nitrates/glipizide/metformin) now off pressors and hemodynamically stable.

## 2018-07-19 NOTE — PROGRESS NOTE ADULT - SUBJECTIVE AND OBJECTIVE BOX
Patient is a 58y old  Male who presents with a chief complaint of CCB overdose, shock (18 Jul 2018 05:39)      BRIEF HOSPITAL COURSE: 58M with PMHx of HTN, DM who presented to ED earlier yesterday after taking a handful of pill (norvasc/imdur/metformin) following an argument with his wife.  Pt was found by wife slurring his speech with empty pill bottles.  Upon arrival to ED pt noted to be in shock with lactemia. Pt was given fluids, calcium, and glucagon with subsequent placement on insulin infusion and D10 drip after consult with toxicology.  Pt remained unresponsive to treatment and pressors were initiated (Levo and Vaso).  Pt was transferred to ICU for further management.    Events last 24 hours: Pt weaned off pressors last evening and has remained hemodynamically stable, afebrile, without bradycardia or rhythm disturbance.  He is tolerating PO intake. Denies CP, SOB, nausea, abdominal pain, vomiting.     PAST MEDICAL & SURGICAL HISTORY:  HTN (hypertension)  DM (diabetes mellitus)  No significant past surgical history      Review of Systems:  CONSTITUTIONAL: No fever, chills, or fatigue  NECK: No pain or stiffness  RESPIRATORY: No cough, wheezing, chills or hemoptysis; No shortness of breath  CARDIOVASCULAR: No chest pain, palpitations, dizziness, or leg swelling  GASTROINTESTINAL: No abdominal or epigastric pain. No nausea, vomiting, or hematemesis; No diarrhea or constipation. No melena or hematochezia.  NEUROLOGICAL: No headaches, memory loss, loss of strength, numbness, or tremors    Medications:            heparin  Injectable 5000 Unit(s) SubCutaneous every 8 hours          potassium chloride    Tablet ER 20 milliEquivalent(s) Oral once  potassium chloride  20 mEq/100 mL IVPB 20 milliEquivalent(s) IV Intermittent every 1 hour      methyl salicylate 14%/menthol 6% Topical Ointment 1 Application(s) Topical three times a day            ICU Vital Signs Last 24 Hrs  T(C): 38 (18 Jul 2018 23:00), Max: 38.2 (18 Jul 2018 20:00)  T(F): 100.4 (18 Jul 2018 23:00), Max: 100.8 (18 Jul 2018 20:00)  HR: 94 (19 Jul 2018 01:00) (65 - 96)  BP: 110/56 (19 Jul 2018 01:00) (94/51 - 128/58)  BP(mean): 78 (19 Jul 2018 01:00) (54 - 84)  ABP: 69/62 (18 Jul 2018 09:00) (68/35 - 101/48)  ABP(mean): 64 (18 Jul 2018 09:00) (45 - 64)  RR: 28 (19 Jul 2018 01:00) (20 - 39)  SpO2: 93% (19 Jul 2018 01:00) (93% - 100%)      ABG - ( 18 Jul 2018 01:56 )  pH, Arterial: 7.37  pH, Blood: x     /  pCO2: 32    /  pO2: 94    / HCO3: 20    / Base Excess: -5.4  /  SaO2: 98                        LABS:                        13.4   17.1  )-----------( 208      ( 18 Jul 2018 05:58 )             39.0     07-18    138  |  105  |  15.0  ----------------------------<  130<H>  3.1<L>   |  20.0<L>  |  1.28    Ca    7.7<L>      18 Jul 2018 22:19  Phos  3.4     07-18  Mg     2.4     07-18    TPro  5.3<L>  /  Alb  3.1<L>  /  TBili  0.9  /  DBili  x   /  AST  30  /  ALT  31  /  AlkPhos  37<L>  07-18      CARDIAC MARKERS ( 17 Jul 2018 23:48 )  x     / x     / 669 U/L / x     / 5.8 ng/mL      CAPILLARY BLOOD GLUCOSE      POCT Blood Glucose.: 100 mg/dL (19 Jul 2018 01:13)    PT/INR - ( 18 Jul 2018 05:58 )   PT: 12.7 sec;   INR: 1.15 ratio            Physical Examination:    General: No acute distress.  Alert but sleepy after waking, oriented, interactive, nonfocal    HEENT: Pupils equal, reactive to light.  Symmetric. sclera anicteric    PULM: Clear to auscultation bilaterally, no significant sputum production    CVS: Regular rate and rhythm, no murmurs, rubs, or gallops    ABD: Soft, softly distended,  nontender, normoactive bowel sounds, no masses    EXT: No edema, nontender, palpable DP/radial bilaterally, cap refill<2 sec    SKIN: Warm and well perfused, no rashes noted.    RADIOLOGY: EXAM:  XR CHEST AP OR PA 1V                          PROCEDURE DATE:  07/18/2018          INTERPRETATION:    INDICATION: Line placement.    Single frontal view of chest dated 7/18/2018 5:09 AM,     No previous   studies are available for comparison.    FINDINGS /   IMPRESSION:     There is no acute consolidation.  There are no pleural effusion or   pneumothorax. Cardiac and mediastinal structures are within normal   limits.   There is a right jugular catheter tip in region of SVC. There   are degenerative changes.       CRITICAL CARE TIME SPENT: 30mins of critical care time spent evaluating/treating pt, reviewing charts/labs/films, discussing care plan with bedside team, noninclusive of procedural time.

## 2018-07-19 NOTE — PROGRESS NOTE ADULT - PROBLEM SELECTOR PLAN 4
Cr trending downward  remains with appropriate urine output  avoid MAP<65  avoid nephrotoxics
Cr trending downward  remains with appropriate urine output  renal following, one dose of calcium given today

## 2018-07-19 NOTE — PROGRESS NOTE ADULT - SUBJECTIVE AND OBJECTIVE BOX
[facility] DIVISION OF KIDNEY DISEASES AND HYPERTENSION -- FOLLOW UP NOTE  --------------------------------------------------------------------------------  Chief Complaint:  Overdose    24 hour events/subjective:  Pt seen today   NAD  Slowly improving overall  Insulin gtt off  Acidosis improving        PAST HISTORY  --------------------------------------------------------------------------------  No significant changes to PMH, PSH, FHx, SHx, unless otherwise noted    ALLERGIES & MEDICATIONS  --------------------------------------------------------------------------------  Allergies    No Known Allergies    Intolerances      Standing Inpatient Medications  aspirin enteric coated 81 milliGRAM(s) Oral daily  atorvastatin 20 milliGRAM(s) Oral at bedtime  dextrose 5%. 1000 milliLiter(s) IV Continuous <Continuous>  dextrose 50% Injectable 12.5 Gram(s) IV Push once  dextrose 50% Injectable 25 Gram(s) IV Push once  dextrose 50% Injectable 25 Gram(s) IV Push once  ergocalciferol 90249 Unit(s) Oral every week  heparin  Injectable 5000 Unit(s) SubCutaneous every 8 hours  insulin lispro (HumaLOG) corrective regimen sliding scale   SubCutaneous three times a day before meals  methyl salicylate 14%/menthol 6% Topical Ointment 1 Application(s) Topical three times a day    PRN Inpatient Medications  dextrose 40% Gel 15 Gram(s) Oral once PRN  glucagon  Injectable 1 milliGRAM(s) IntraMuscular once PRN      REVIEW OF SYSTEMS  --------------------------------------------------------------------------------  Gen: No weight changes, fatigue, fevers/chills, weakness  Skin: No rashes  Head/Eyes/Ears/Mouth: No headache; Normal hearing; Normal vision w/o blurriness; No sinus pain/discomfort, sore throat  Respiratory: No dyspnea, cough, wheezing, hemoptysis  CV: No chest pain, PND, orthopnea  GI: No abdominal pain, diarrhea, constipation, nausea, vomiting, melena, hematochezia  : No increased frequency, dysuria, hematuria, nocturia  MSK: No joint pain/swelling; no back pain; no edema  Neuro: No dizziness/lightheadedness, weakness, seizures, numbness, tingling  Heme: No easy bruising or bleeding  Endo: No heat/cold intolerance  Psych: No significant nervousness, anxiety, stress, depression    All other systems were reviewed and are negative, except as noted.    VITALS/PHYSICAL EXAM  --------------------------------------------------------------------------------  T(C): 37.9 (07-19-18 @ 12:00), Max: 38.2 (07-18-18 @ 20:00)  HR: 100 (07-19-18 @ 13:00) (77 - 100)  BP: 125/60 (07-19-18 @ 13:00) (94/51 - 135/66)  RR: 30 (07-19-18 @ 13:00) (21 - 39)  SpO2: 93% (07-19-18 @ 13:00) (92% - 96%)  Wt(kg): --  Height (cm): 185.42 (07-18-18 @ 02:09)  Weight (kg): 128 (07-18-18 @ 02:09)  BMI (kg/m2): 37.2 (07-18-18 @ 02:09)  BSA (m2): 2.49 (07-18-18 @ 02:09)      07-18-18 @ 07:01  -  07-19-18 @ 07:00  --------------------------------------------------------  IN: 7281 mL / OUT: 2580 mL / NET: 4701 mL    07-19-18 @ 07:01  -  07-19-18 @ 14:10  --------------------------------------------------------  IN: 375 mL / OUT: 1250 mL / NET: -875 mL      Physical Exam:  	Gen: NAD, well-appearing  	HEENT: PERRL, supple neck, clear oropharynx  	Pulm: CTA B/L  	CV: RRR, S1S2; no rub  	Back: No spinal or CVA tenderness; no sacral edema  	Abd: +BS, soft, nontender/nondistended  	: No suprapubic tenderness  	UE: Warm, FROM, no clubbing, intact strength; no edema; no asterixis  	LE: Warm, FROM, no clubbing, intact strength; no edema  	Neuro: No focal deficits, intact gait  	Psych: Normal affect and mood  	Skin: Warm, without rashes  	Vascular access:    LABS/STUDIES  --------------------------------------------------------------------------------              11.7   10.4  >-----------<  107      [07-19-18 @ 06:22]              34.4     142  |  108  |  12.0  ----------------------------<  98      [07-19-18 @ 06:18]  4.2   |  22.0  |  1.09        Ca     7.9     [07-19-18 @ 06:18]      Mg     2.4     [07-19-18 @ 06:18]      Phos  4.0     [07-19-18 @ 06:18]    TPro  5.8  /  Alb  3.4  /  TBili  1.2  /  DBili  x   /  AST  35  /  ALT  34  /  AlkPhos  40  [07-19-18 @ 06:18]    PT/INR: PT 12.7 , INR 1.15       [07-18-18 @ 05:58]          [07-17-18 @ 23:48]    Creatinine Trend:  SCr 1.09 [07-19 @ 06:18]  SCr 1.28 [07-18 @ 22:19]  SCr 1.23 [07-18 @ 19:42]  SCr 1.15 [07-18 @ 12:26]  SCr 1.87 [07-18 @ 05:58]        TSH 3.81      [07-17-18 @ 23:48]

## 2018-07-19 NOTE — PROGRESS NOTE ADULT - ASSESSMENT
58M with PMHx of HTN, DM with multi-drug overdose (CCB/nitrates/glipizide/metformin) now off pressors and hemodynamically stable.    Case and plan discussed with eICU attending.

## 2018-07-19 NOTE — PROGRESS NOTE ADULT - PROBLEM SELECTOR PLAN 2
secondary to shock and drug OD, now resolve  continue to avoid neurosuppressants  ongoing neuro checks

## 2018-07-19 NOTE — PROGRESS NOTE ADULT - SUBJECTIVE AND OBJECTIVE BOX
CC: OD on multiple medications. Obtundation , shock, resolving.   HPI:  57 y/o M with a h/o HTN, DM, presents to ED after taking a handful of about 50 pills (amlodipine, Imdur, metformin). He did this after having an argument with his wife- she reports that he told her that she won't have to see him anymore and he began slurring his speech. In the ED the patient was noted to be hypotensive (SBP: 70's). HR stable. He was given 2L IVF bolus, IV CaCl, glucagon, a 1u/kg insulin bolus followed by a 100u/hr insulin infusion, and ultimately started on a Levophed infusion. Lactate: 5.7. The toxicology team @ [facility] was consulted. The patient is A&Ox3 and is requesting to see his wife and daughter. He denies further suicidal ideation. His wife reports that ever since the patient's father  about 1 year ago he has been upset, but has never done anything like this before. [facility] but has not been formally diagnosed with any psychiatric issues. (2018 02:44)    REVIEW OF SYSTEMS:    Patient denied fever, chills, abdominal pain, nausea, vomiting, cough, shortness of breath, chest pain or palpitations    Vital Signs Last 24 Hrs  T(C): 37.9 (2018 12:00), Max: 38.2 (2018 20:00)  T(F): 100.2 (2018 12:00), Max: 100.8 (2018 20:00)  HR: 102 (2018 15:) (77 - 102)  BP: 133/62 (2018 15:00) (94/51 - 135/66)  BP(mean): 89 (2018 15:00) (54 - 90)  RR: 34 (2018 15:00) (21 - 39)  SpO2: 95% (2018 15:00) (92% - 96%)I&O's Summary    2018 07:01  -  2018 07:00  --------------------------------------------------------  IN: 7281 mL / OUT: 2580 mL / NET: 4701 mL    2018 07:01  -  2018 15:55  --------------------------------------------------------  IN: 375 mL / OUT: 1450 mL / NET: -1075 mL      PHYSICAL EXAM:  GENERAL: NAD, well-groomed  HEENT: PERRL, +EOMI, anicteric, no [place]  NECK: Supple, No JVD   CHEST/LUNG: CTA bilaterally; Normal effort  HEART: S1S2 Normal intensity, no murmurs, gallops or rubs noted  ABDOMEN: Soft, BS Normoactive, NT, ND, no HSM noted  EXTREMITIES:  2+ radial and DP pulses noted, no clubbing, cyanosis, or edema noted, FROM x 4  SKIN: No rashes or lesions noted  NEURO: A&Ox3, no focal deficits noted, CN II-XII intact  PSYCH: normal mood and affect; insight/judgement appropriate  LABS:                        11.7   10.4  )-----------( 107      ( 2018 06:22 )             34.4     -    142  |  108<H>  |  12.0  ----------------------------<  98  4.2   |  22.0  |  1.09    Ca    7.9<L>      2018 06:18  Phos  4.0       Mg     2.4         TPro  5.8<L>  /  Alb  3.4  /  TBili  1.2  /  DBili  x   /  AST  35  /  ALT  34  /  AlkPhos  40  -    PT/INR - ( 2018 05:58 )   PT: 12.7 sec;   INR: 1.15 ratio             RADIOLOGY & ADDITIONAL TESTS:    MEDICATIONS:  MEDICATIONS  (STANDING):  aspirin enteric coated 81 milliGRAM(s) Oral daily  atorvastatin 20 milliGRAM(s) Oral at bedtime  dextrose 5%. 1000 milliLiter(s) (50 mL/Hr) IV Continuous <Continuous>  dextrose 50% Injectable 12.5 Gram(s) IV Push once  dextrose 50% Injectable 25 Gram(s) IV Push once  dextrose 50% Injectable 25 Gram(s) IV Push once  ergocalciferol 80705 Unit(s) Oral every week  heparin  Injectable 5000 Unit(s) SubCutaneous every 8 hours  insulin lispro (HumaLOG) corrective regimen sliding scale   SubCutaneous three times a day before meals  methyl salicylate 14%/menthol 6% Topical Ointment 1 Application(s) Topical three times a day    MEDICATIONS  (PRN):  dextrose 40% Gel 15 Gram(s) Oral once PRN Blood Glucose LESS THAN 70 milliGRAM(s)/deciliter  glucagon  Injectable 1 milliGRAM(s) IntraMuscular once PRN Glucose LESS THAN 70 milligrams/deciliter CC: OD on multiple medications. Obtundation , shock, resolving.   HPI:  57 y/o M with a h/o HTN, DM, presents to ED after taking a handful of about 50 pills (amlodipine, Imdur, metformin). He did this after having an argument with his wife- she reports that he told her that she won't have to see him anymore and he began slurring his speech. In the ED the patient was noted to be hypotensive (SBP: 70's). HR stable. He was given 2L IVF bolus, IV CaCl, glucagon, a 1u/kg insulin bolus followed by a 100u/hr insulin infusion, and ultimately started on a Levophed infusion. Lactate: 5.7. The toxicology team @ [facility] was consulted. The patient is A&Ox3 and is requesting to see his wife and daughter. He denies further suicidal ideation. His wife reports that ever since the patient's father  about 1 year ago he has been upset, but has never done anything like this before. [facility] but has not been formally diagnosed with any psychiatric issues. (2018 02:44)    REVIEW OF SYSTEMS:    Patient denied fever, chills, abdominal pain, nausea, vomiting, cough, shortness of breath, chest pain or palpitations    Vital Signs Last 24 Hrs  T(C): 37.9 (2018 12:00), Max: 38.2 (2018 20:00)  T(F): 100.2 (2018 12:00), Max: 100.8 (2018 20:00)  HR: 102 (2018 15:) (77 - 102)  BP: 133/62 (2018 15:00) (94/51 - 135/66)  BP(mean): 89 (2018 15:00) (54 - 90)  RR: 34 (2018 15:00) (21 - 39)  SpO2: 95% (2018 15:00) (92% - 96%)I&O's Summary    2018 07:01  -  2018 07:00  --------------------------------------------------------  IN: 7281 mL / OUT: 2580 mL / NET: 4701 mL    2018 07:01  -  2018 15:55  --------------------------------------------------------  IN: 375 mL / OUT: 1450 mL / NET: -1075 mL      PHYSICAL EXAM:  GENERAL: NAD,   HEENT: PERRL, +EOMI, anicteric, no [place]  NECK: Supple, No JVD   CHEST/LUNG: CTA bilaterally; Normal effort  HEART: S1S2 Normal intensity, no murmurs, gallops or rubs noted  ABDOMEN: Soft, BS Normoactive, NT, ND, no HSM noted  EXTREMITIES:  2+ radial and DP pulses noted, no clubbing, cyanosis, or edema noted, FROM x 4  SKIN: No rashes or lesions noted  NEURO: A&Ox3, no focal deficits noted, CN II-XII intact  PSYCH: Depressed mood and affect; insight/judgement appropriate  LABS:                        11.7   10.4  )-----------( 107      ( 2018 06:22 )             34.4     07-    142  |  108<H>  |  12.0  ----------------------------<  98  4.2   |  22.0  |  1.09    Ca    7.9<L>      2018 06:18  Phos  4.0       Mg     2.4         TPro  5.8<L>  /  Alb  3.4  /  TBili  1.2  /  DBili  x   /  AST  35  /  ALT  34  /  AlkPhos  40      PT/INR - ( 2018 05:58 )   PT: 12.7 sec;   INR: 1.15 ratio             RADIOLOGY & ADDITIONAL TESTS:    MEDICATIONS:  MEDICATIONS  (STANDING):  aspirin enteric coated 81 milliGRAM(s) Oral daily  atorvastatin 20 milliGRAM(s) Oral at bedtime  dextrose 5%. 1000 milliLiter(s) (50 mL/Hr) IV Continuous <Continuous>  dextrose 50% Injectable 12.5 Gram(s) IV Push once  dextrose 50% Injectable 25 Gram(s) IV Push once  dextrose 50% Injectable 25 Gram(s) IV Push once  ergocalciferol 06822 Unit(s) Oral every week  heparin  Injectable 5000 Unit(s) SubCutaneous every 8 hours  insulin lispro (HumaLOG) corrective regimen sliding scale   SubCutaneous three times a day before meals  methyl salicylate 14%/menthol 6% Topical Ointment 1 Application(s) Topical three times a day    MEDICATIONS  (PRN):  dextrose 40% Gel 15 Gram(s) Oral once PRN Blood Glucose LESS THAN 70 milliGRAM(s)/deciliter  glucagon  Injectable 1 milliGRAM(s) IntraMuscular once PRN Glucose LESS THAN 70 milligrams/deciliter

## 2018-07-19 NOTE — PROGRESS NOTE ADULT - PROBLEM SELECTOR PLAN 5
discontinuing insulin drip  will change FSG to q1hour x 2 more hours, if remains stable will change to [id number] with ISS coverage  continue to hold all oral hyperglycemics

## 2018-07-19 NOTE — PROGRESS NOTE ADULT - ASSESSMENT
58M with PMHx of HTN, DM with multi-drug overdose (CCB/nitrates/glipizide/metformin) now off pressors and hemodynamically stable.    Case and plan discussed with eICU attending.     Problem/Plan - 1:  ·  Problem: Drug overdose.  Plan: multi drug overdose   will discontinue insulin infusion now that he is off pressors and hemodynamically stable  will remain on 1:1 observation.      Problem/Plan - 2:  ·  Problem: Altered mental status.  Plan: secondary to shock and drug OD, now resolve  continue to avoid neurosuppressants  ongoing neuro checks.      Problem/Plan - 3:  ·  Problem: Shock.  Plan: now off pressors since early last evening  will discontinue insulin infusion now that shock has resolved  keep MAP>65  monitor urine output closely (goal >0.5cc/kg/hr)  will continue to hold all anti-HTN for now  AG has closed, lactate normal.      Problem/Plan - 4:  ·  Problem: MIRZA (acute kidney injury).  Plan: Cr trending downward  remains with appropriate urine output  avoid MAP<65  avoid nephrotoxics.      Problem/Plan - 5:  ·  Problem: DM (diabetes mellitus).  Plan: discontinuing insulin drip  will change FSG to q1hour x 2 more hours, if remains stable will change to [id number] with ISS coverage  continue to hold all oral hyperglycemics.      Problem/Plan - 6:  Problem: Hypokalemia. Plan: replete KCL 40 IV and 20 PO  repeat BMP following repletion. 58M with PMHx of HTN, DM with multi-drug overdose (CCB/nitrates/glipizide/metformin) now off pressors and hemodynamically stable.       Problem/Plan - 1:  ·  Problem: Drug overdose.  Plan: multi drug overdose   discontinued insulin infusion now that he is off pressors and hemodynamically stable  will remain on 1:1 observation.   For eventual psych inpatient transfer      Problem/Plan - 2:  ·  Problem: Altered mental status.  Plan: secondary to shock and drug OD, now resolve. Awake alert conversant.   headache   ongoing neuro checks.      Problem/Plan - 3:  ·  Problem: Shock.  Plan: now off pressors since shock state is resolved  keep MAP>65  monitor urine output closely (goal >0.5cc/kg/hr)  will continue to hold all anti-HTN for now  AG has closed, lactate normal.      Problem/Plan - 4:  ·  Problem: MIRZA (acute kidney injury).  Plan: Cr trending downward  remains with appropriate urine output  avoid nephrotoxics.      Problem/Plan - 5:  ·  Problem: DM (diabetes mellitus).  Plan: discontinuing insulin drip  Meal time tid insulin coverage .      Problem/Plan - 6:  Problem: Hypokalemia. Plan: repleted , normalized  following  BMP    Supportive care

## 2018-07-19 NOTE — PROGRESS NOTE ADULT - PROBLEM SELECTOR PLAN 1
multi drug overdose   will discontinue insulin infusion now that he is off pressors and hemodynamically stable  will remain on 1:1 observation

## 2018-07-20 LAB
ALBUMIN SERPL ELPH-MCNC: 4 G/DL — SIGNIFICANT CHANGE UP (ref 3.3–5.2)
ALP SERPL-CCNC: 58 U/L — SIGNIFICANT CHANGE UP (ref 40–120)
ALT FLD-CCNC: 35 U/L — SIGNIFICANT CHANGE UP
ANION GAP SERPL CALC-SCNC: 14 MMOL/L — SIGNIFICANT CHANGE UP (ref 5–17)
AST SERPL-CCNC: 30 U/L — SIGNIFICANT CHANGE UP
BILIRUB SERPL-MCNC: 1.6 MG/DL — SIGNIFICANT CHANGE UP (ref 0.4–2)
BUN SERPL-MCNC: 15 MG/DL — SIGNIFICANT CHANGE UP (ref 8–20)
CALCIUM SERPL-MCNC: 9.1 MG/DL — SIGNIFICANT CHANGE UP (ref 8.6–10.2)
CHLORIDE SERPL-SCNC: 101 MMOL/L — SIGNIFICANT CHANGE UP (ref 98–107)
CO2 SERPL-SCNC: 25 MMOL/L — SIGNIFICANT CHANGE UP (ref 22–29)
CREAT SERPL-MCNC: 0.92 MG/DL — SIGNIFICANT CHANGE UP (ref 0.5–1.3)
GLUCOSE BLDC GLUCOMTR-MCNC: 214 MG/DL — HIGH (ref 70–99)
GLUCOSE BLDC GLUCOMTR-MCNC: 227 MG/DL — HIGH (ref 70–99)
GLUCOSE BLDC GLUCOMTR-MCNC: 270 MG/DL — HIGH (ref 70–99)
GLUCOSE BLDC GLUCOMTR-MCNC: 350 MG/DL — HIGH (ref 70–99)
GLUCOSE BLDC GLUCOMTR-MCNC: 386 MG/DL — HIGH (ref 70–99)
GLUCOSE SERPL-MCNC: 228 MG/DL — HIGH (ref 70–115)
HBA1C BLD-MCNC: 7.7 % — HIGH (ref 4–5.6)
HCT VFR BLD CALC: 40.3 % — LOW (ref 42–52)
HGB BLD-MCNC: 14.3 G/DL — SIGNIFICANT CHANGE UP (ref 14–18)
MAGNESIUM SERPL-MCNC: 1.9 MG/DL — SIGNIFICANT CHANGE UP (ref 1.6–2.6)
MCHC RBC-ENTMCNC: 30.2 PG — SIGNIFICANT CHANGE UP (ref 27–31)
MCHC RBC-ENTMCNC: 35.5 G/DL — SIGNIFICANT CHANGE UP (ref 32–36)
MCV RBC AUTO: 85.2 FL — SIGNIFICANT CHANGE UP (ref 80–94)
PLATELET # BLD AUTO: 117 K/UL — LOW (ref 150–400)
POTASSIUM SERPL-MCNC: 4 MMOL/L — SIGNIFICANT CHANGE UP (ref 3.5–5.3)
POTASSIUM SERPL-SCNC: 4 MMOL/L — SIGNIFICANT CHANGE UP (ref 3.5–5.3)
PROCALCITONIN SERPL-MCNC: 0.58 NG/ML — HIGH (ref 0–0.04)
PROT SERPL-MCNC: 7.1 G/DL — SIGNIFICANT CHANGE UP (ref 6.6–8.7)
RBC # BLD: 4.73 M/UL — SIGNIFICANT CHANGE UP (ref 4.6–6.2)
RBC # FLD: 12.5 % — SIGNIFICANT CHANGE UP (ref 11–15.6)
SODIUM SERPL-SCNC: 140 MMOL/L — SIGNIFICANT CHANGE UP (ref 135–145)
WBC # BLD: 9.6 K/UL — SIGNIFICANT CHANGE UP (ref 4.8–10.8)
WBC # FLD AUTO: 9.6 K/UL — SIGNIFICANT CHANGE UP (ref 4.8–10.8)

## 2018-07-20 PROCEDURE — 99233 SBSQ HOSP IP/OBS HIGH 50: CPT

## 2018-07-20 RX ORDER — ACETAMINOPHEN 500 MG
650 TABLET ORAL EVERY 6 HOURS
Qty: 0 | Refills: 0 | Status: DISCONTINUED | OUTPATIENT
Start: 2018-07-20 | End: 2018-07-24

## 2018-07-20 RX ADMIN — HEPARIN SODIUM 5000 UNIT(S): 5000 INJECTION INTRAVENOUS; SUBCUTANEOUS at 21:41

## 2018-07-20 RX ADMIN — HEPARIN SODIUM 5000 UNIT(S): 5000 INJECTION INTRAVENOUS; SUBCUTANEOUS at 11:42

## 2018-07-20 RX ADMIN — Medication 81 MILLIGRAM(S): at 11:42

## 2018-07-20 RX ADMIN — Medication 2: at 16:22

## 2018-07-20 RX ADMIN — ATORVASTATIN CALCIUM 20 MILLIGRAM(S): 80 TABLET, FILM COATED ORAL at 21:42

## 2018-07-20 RX ADMIN — MENTHOL AND METHYL SALICYLATE 1 APPLICATION(S): 10; 30 STICK TOPICAL at 05:21

## 2018-07-20 RX ADMIN — Medication 3: at 11:42

## 2018-07-20 RX ADMIN — MENTHOL AND METHYL SALICYLATE 1 APPLICATION(S): 10; 30 STICK TOPICAL at 11:41

## 2018-07-20 RX ADMIN — Medication 2: at 08:36

## 2018-07-20 RX ADMIN — MENTHOL AND METHYL SALICYLATE 1 APPLICATION(S): 10; 30 STICK TOPICAL at 21:42

## 2018-07-20 RX ADMIN — HEPARIN SODIUM 5000 UNIT(S): 5000 INJECTION INTRAVENOUS; SUBCUTANEOUS at 05:20

## 2018-07-20 NOTE — PROGRESS NOTE ADULT - ASSESSMENT
1) Toxic ATN  2) Oliguria  3) CCB overdose;metformin overdose  4) Acidosis  5) Rhabdo    Insulin gtt discontinued  IVF D/C'd  Mental status improved; awake ; alert; oriented;  Acidosis improving  Making urine  Signing off as renal fx and acidosis normalized  Please recall if needed

## 2018-07-20 NOTE — PROGRESS NOTE ADULT - SUBJECTIVE AND OBJECTIVE BOX
CC: CCB overdose, shock     HPI:  59 y/o M with a h/o HTN, DM, presents to ED after taking a handful of about 50 pills (amlodipine, Imdur, metformin). He did this after having an argument with his wife- she reports that he told her that she won't have to see him anymore and he began slurring his speech. In the ED the patient was noted to be hypotensive (SBP: 70's). HR stable. He was given 2L IVF bolus, IV CaCl, glucagon, a 1u/kg insulin bolus followed by a 100u/hr insulin infusion, and ultimately started on a Levophed infusion. Lactate: 5.7. The toxicology team @ [facility] was consulted.     INTERVAL HPI/OVERNIGHT EVENTS: Patient seen and examined lying in bed.  Patient complained of 1 episode of diarrhea this am.  Patient denies any headache, dizziness, SOB, CP, abdominal pain, nausea, vomiting, dysuria.  Other ROS reviewed and are negative.    Vital Signs Last 24 Hrs  T(C): 37.2 (2018 07:06), Max: 38.8 (2018 20:40)  T(F): 99 (2018 07:06), Max: 101.8 (2018 20:40)  HR: 99 (2018 07:06) (99 - 105)  BP: 153/82 (2018 07:06) (102/53 - 153/82)  BP(mean): 91 (2018 17:00) (88 - 91)  RR: 18 (2018 07:06) (17 - 34)  SpO2: 95% (2018 07:06) (92% - 100%)  I&O's Detail    2018 07:01  -  2018 07:00  --------------------------------------------------------  IN:    dextrose 5% + sodium chloride 0.45%.: 75 mL    Oral Fluid: 200 mL    Solution: 100 mL  Total IN: 375 mL    OUT:    Indwelling Catheter - Urethral: 2350 mL    Voided: 2550 mL  Total OUT: 4900 mL    Total NET: -4525 mL      PHYSICAL EXAM:  GENERAL: NAD, well-groomed, well-developed  HEAD:  Atraumatic, Normocephalic  NECK: Supple, No JVD, Normal thyroid  NERVOUS SYSTEM:  Alert & Oriented X3, Good concentration; Motor Strength 5/5 B/L upper and lower extremities  CHEST/LUNG: Clear to auscultation bilaterally; No rales, rhonchi, wheezing, or rubs  HEART: Regular rate and rhythm; No murmurs, rubs, or gallops  ABDOMEN: Soft, Nontender, Nondistended; Bowel sounds present  EXTREMITIES:  2+ Peripheral Pulses, No clubbing, cyanosis, or edema                                14.3   9.6   )-----------( 117      ( 2018 07:54 )             40.3     2018 07:54    140    |  101    |  15.0   ----------------------------<  228    4.0     |  25.0   |  0.92     Ca    9.1        2018 07:54  Phos  4.0       2018 06:18  Mg     1.9       2018 07:54    TPro  7.1    /  Alb  4.0    /  TBili  1.6    /  DBili  x      /  AST  30     /  ALT  35     /  AlkPhos  58     2018 07:54      CAPILLARY BLOOD GLUCOSE  POCT Blood Glucose.: 270 mg/dL (2018 11:40)  POCT Blood Glucose.: 214 mg/dL (2018 08:36)  POCT Blood Glucose.: 259 mg/dL (2018 20:48)  POCT Blood Glucose.: 237 mg/dL (2018 16:30)    LIVER FUNCTIONS - ( 2018 07:54 )  Alb: 4.0 g/dL / Pro: 7.1 g/dL / ALK PHOS: 58 U/L / ALT: 35 U/L / AST: 30 U/L / GGT: x           Urinalysis Basic - ( 2018 21:48 )    Color: Yellow / Appearance: Clear / S.010 / pH: x  Gluc: x / Ketone: Negative  / Bili: Negative / Urobili: Negative mg/dL   Blood: x / Protein: Negative mg/dL / Nitrite: Negative   Leuk Esterase: Negative / RBC: 3-5 /HPF / WBC 0-2   Sq Epi: x / Non Sq Epi: Occasional / Bacteria: Occasional      Hemoglobin A1C, Whole Blood: 7.7 % (18 @ 07:54)    MEDICATIONS  (STANDING):  aspirin enteric coated 81 milliGRAM(s) Oral daily  atorvastatin 20 milliGRAM(s) Oral at bedtime  dextrose 5%. 1000 milliLiter(s) (50 mL/Hr) IV Continuous <Continuous>  dextrose 50% Injectable 12.5 Gram(s) IV Push once  dextrose 50% Injectable 25 Gram(s) IV Push once  dextrose 50% Injectable 25 Gram(s) IV Push once  ergocalciferol 60278 Unit(s) Oral every week  heparin  Injectable 5000 Unit(s) SubCutaneous every 8 hours  insulin lispro (HumaLOG) corrective regimen sliding scale   SubCutaneous three times a day before meals  methyl salicylate 14%/menthol 6% Topical Ointment 1 Application(s) Topical three times a day    MEDICATIONS  (PRN):  acetaminophen   Tablet 650 milliGRAM(s) Oral every 6 hours PRN For Temp greater than 38 C (100.4 F)  acetaminophen   Tablet. 650 milliGRAM(s) Oral every 6 hours PRN Moderate Pain (4 - 6)  dextrose 40% Gel 15 Gram(s) Oral once PRN Blood Glucose LESS THAN 70 milliGRAM(s)/deciliter  glucagon  Injectable 1 milliGRAM(s) IntraMuscular once PRN Glucose LESS THAN 70 milligrams/deciliter

## 2018-07-20 NOTE — PROGRESS NOTE ADULT - ASSESSMENT
58M with PMHx of HTN, DM with multi-drug overdose (CCB/nitrates/glipizide/metformin) now off pressors and hemodynamically stable.       Problem/Plan - 1:  ·  Problem: Drug overdose.  Plan: multi drug overdose   discontinued insulin infusion now that he is off pressors and hemodynamically stable  continue 1:1 observation.   For eventual psych inpatient transfer      Problem/Plan - 2:  ·  Problem: Fever.  Plan: Follow up blood and urine cultures.  Monitor CBC.  Check C diff if diarrhea continues.     Problem/Plan - 3:  ·  Problem: Altered mental status.  Plan: secondary to shock and drug OD, now resolve. Awake alert conversant.   headache   ongoing neuro checks.      Problem/Plan - 4:  ·  Problem: Shock.  Plan: now off pressors since shock state is resolved  monitor urine output closely (goal >0.5cc/kg/hr)  will continue to hold all anti-HTN for now  AG has closed, lactate normal.      Problem/Plan - 5:  ·  Problem: MIRZA (acute kidney injury).  Plan: Cr trending downward  remains with appropriate urine output  avoid nephrotoxics.      Problem/Plan - 6:  ·  Problem: DM (diabetes mellitus).  Plan:   Meal time tid insulin coverage .      Problem/Plan - 7:  Problem: Hypokalemia. Plan: Improved  following  BMP    Supportive care

## 2018-07-21 DIAGNOSIS — F33.8 OTHER RECURRENT DEPRESSIVE DISORDERS: ICD-10-CM

## 2018-07-21 LAB
ALBUMIN SERPL ELPH-MCNC: 3.6 G/DL — SIGNIFICANT CHANGE UP (ref 3.3–5.2)
ALP SERPL-CCNC: 56 U/L — SIGNIFICANT CHANGE UP (ref 40–120)
ALT FLD-CCNC: 34 U/L — SIGNIFICANT CHANGE UP
ANION GAP SERPL CALC-SCNC: 16 MMOL/L — SIGNIFICANT CHANGE UP (ref 5–17)
AST SERPL-CCNC: 22 U/L — SIGNIFICANT CHANGE UP
BILIRUB SERPL-MCNC: 0.9 MG/DL — SIGNIFICANT CHANGE UP (ref 0.4–2)
BUN SERPL-MCNC: 15 MG/DL — SIGNIFICANT CHANGE UP (ref 8–20)
CALCIUM SERPL-MCNC: 9.3 MG/DL — SIGNIFICANT CHANGE UP (ref 8.6–10.2)
CHLORIDE SERPL-SCNC: 98 MMOL/L — SIGNIFICANT CHANGE UP (ref 98–107)
CO2 SERPL-SCNC: 25 MMOL/L — SIGNIFICANT CHANGE UP (ref 22–29)
CREAT SERPL-MCNC: 0.92 MG/DL — SIGNIFICANT CHANGE UP (ref 0.5–1.3)
CULTURE RESULTS: SIGNIFICANT CHANGE UP
GLUCOSE BLDC GLUCOMTR-MCNC: 198 MG/DL — HIGH (ref 70–99)
GLUCOSE BLDC GLUCOMTR-MCNC: 198 MG/DL — HIGH (ref 70–99)
GLUCOSE BLDC GLUCOMTR-MCNC: 293 MG/DL — HIGH (ref 70–99)
GLUCOSE SERPL-MCNC: 201 MG/DL — HIGH (ref 70–115)
HCT VFR BLD CALC: 45 % — SIGNIFICANT CHANGE UP (ref 42–52)
HGB BLD-MCNC: 15.8 G/DL — SIGNIFICANT CHANGE UP (ref 14–18)
MCHC RBC-ENTMCNC: 29.7 PG — SIGNIFICANT CHANGE UP (ref 27–31)
MCHC RBC-ENTMCNC: 35.1 G/DL — SIGNIFICANT CHANGE UP (ref 32–36)
MCV RBC AUTO: 84.6 FL — SIGNIFICANT CHANGE UP (ref 80–94)
PLATELET # BLD AUTO: 129 K/UL — LOW (ref 150–400)
POTASSIUM SERPL-MCNC: 3.8 MMOL/L — SIGNIFICANT CHANGE UP (ref 3.5–5.3)
POTASSIUM SERPL-SCNC: 3.8 MMOL/L — SIGNIFICANT CHANGE UP (ref 3.5–5.3)
PROT SERPL-MCNC: 7.4 G/DL — SIGNIFICANT CHANGE UP (ref 6.6–8.7)
RBC # BLD: 5.32 M/UL — SIGNIFICANT CHANGE UP (ref 4.6–6.2)
RBC # FLD: 12.5 % — SIGNIFICANT CHANGE UP (ref 11–15.6)
SODIUM SERPL-SCNC: 139 MMOL/L — SIGNIFICANT CHANGE UP (ref 135–145)
SPECIMEN SOURCE: SIGNIFICANT CHANGE UP
WBC # BLD: 7.3 K/UL — SIGNIFICANT CHANGE UP (ref 4.8–10.8)
WBC # FLD AUTO: 7.3 K/UL — SIGNIFICANT CHANGE UP (ref 4.8–10.8)

## 2018-07-21 PROCEDURE — 70450 CT HEAD/BRAIN W/O DYE: CPT | Mod: 26

## 2018-07-21 PROCEDURE — 99233 SBSQ HOSP IP/OBS HIGH 50: CPT

## 2018-07-21 PROCEDURE — 99232 SBSQ HOSP IP/OBS MODERATE 35: CPT

## 2018-07-21 RX ADMIN — Medication 1: at 08:55

## 2018-07-21 RX ADMIN — MENTHOL AND METHYL SALICYLATE 1 APPLICATION(S): 10; 30 STICK TOPICAL at 12:26

## 2018-07-21 RX ADMIN — HEPARIN SODIUM 5000 UNIT(S): 5000 INJECTION INTRAVENOUS; SUBCUTANEOUS at 12:26

## 2018-07-21 RX ADMIN — MENTHOL AND METHYL SALICYLATE 1 APPLICATION(S): 10; 30 STICK TOPICAL at 05:29

## 2018-07-21 RX ADMIN — HEPARIN SODIUM 5000 UNIT(S): 5000 INJECTION INTRAVENOUS; SUBCUTANEOUS at 21:18

## 2018-07-21 RX ADMIN — HEPARIN SODIUM 5000 UNIT(S): 5000 INJECTION INTRAVENOUS; SUBCUTANEOUS at 05:29

## 2018-07-21 RX ADMIN — Medication 3: at 12:26

## 2018-07-21 RX ADMIN — ATORVASTATIN CALCIUM 20 MILLIGRAM(S): 80 TABLET, FILM COATED ORAL at 21:18

## 2018-07-21 RX ADMIN — Medication 81 MILLIGRAM(S): at 12:26

## 2018-07-21 RX ADMIN — Medication 650 MILLIGRAM(S): at 17:09

## 2018-07-21 RX ADMIN — MENTHOL AND METHYL SALICYLATE 1 APPLICATION(S): 10; 30 STICK TOPICAL at 21:18

## 2018-07-21 RX ADMIN — Medication 1: at 17:08

## 2018-07-21 NOTE — PROGRESS NOTE ADULT - SUBJECTIVE AND OBJECTIVE BOX
CC: Medication overdose. Suicide attempt. Now complaining of headache whenever he coughs.   HPI:  59 y/o M with a h/o HTN, DM, presents to ED after taking a handful of about 50 pills (amlodipine, Imdur, metformin). He did this after having an argument with his wife- she reports that he told her that she won't have to see him anymore and he began slurring his speech. In the ED the patient was noted to be hypotensive (SBP: 70's). HR stable. He was given 2L IVF bolus, IV CaCl, glucagon, a 1u/kg insulin bolus followed by a 100u/hr insulin infusion, and ultimately started on a Levophed infusion. Lactate: 5.7. The toxicology team @ [facility] was consulted. The patient is A&Ox3 and is requesting to see his wife and daughter. He denies further suicidal ideation. His wife reports that ever since the patient's father  about 1 year ago he has been upset, but has never done anything like this before. [facility] but has not been formally diagnosed with any psychiatric issues. (2018 02:44)    REVIEW OF SYSTEMS:    Patient denied fever, chills, abdominal pain, nausea, vomiting, cough, shortness of breath, chest pain or palpitations    Vital Signs Last 24 Hrs  T(C): 37.4 (2018 00:00), Max: 37.4 (2018 00:00)  T(F): 99.4 (2018 00:00), Max: 99.4 (2018 00:00)  HR: 89 (2018 00:00) (89 - 94)  BP: 123/69 (2018 00:00) (123/69 - 129/72)  BP(mean): --  RR: 18 (2018 00:00) (18 - 18)  SpO2: 95% (2018 00:00) (95% - 96%)I&O's Summary    2018 07:01  -  2018 07:00  --------------------------------------------------------  IN: 0 mL / OUT: 440 mL / NET: -440 mL      PHYSICAL EXAM:  GENERAL: NAD,   HEENT: PERRL, +EOMI, anicteric, no [place]  NECK: Supple, No JVD   CHEST/LUNG: CTA bilaterally; Normal effort  HEART: S1S2 Normal intensity, no murmurs, gallops or rubs noted  ABDOMEN: Soft, BS Normoactive, NT, ND, no HSM noted  EXTREMITIES:  2+ radial and DP pulses noted, no clubbing, cyanosis, or edema noted, FROM x 4  SKIN: No rashes or lesions noted  NEURO: A&Ox3, no focal deficits noted, CN II-XII intact  PSYCH: Depressed mood and affect; insight/judgement appropriate  LABS:                        15.8   7.3   )-----------( 129      ( 2018 07:15 )             45.0     07-21    139  |  98  |  15.0  ----------------------------<  201<H>  3.8   |  25.0  |  0.92    Ca    9.3      2018 07:15  Mg     1.9     07-20    TPro  7.4  /  Alb  3.6  /  TBili  0.9  /  DBili  x   /  AST  22  /  ALT  34  /  AlkPhos  56  07-21      Urinalysis Basic - ( 2018 21:48 )    Color: Yellow / Appearance: Clear / S.010 / pH: x  Gluc: x / Ketone: Negative  / Bili: Negative / Urobili: Negative mg/dL   Blood: x / Protein: Negative mg/dL / Nitrite: Negative   Leuk Esterase: Negative / RBC: 3-5 /HPF / WBC 0-2   Sq Epi: x / Non Sq Epi: Occasional / Bacteria: Occasional      RADIOLOGY & ADDITIONAL TESTS:    MEDICATIONS:  MEDICATIONS  (STANDING):  aspirin enteric coated 81 milliGRAM(s) Oral daily  atorvastatin 20 milliGRAM(s) Oral at bedtime  dextrose 5%. 1000 milliLiter(s) (50 mL/Hr) IV Continuous <Continuous>  dextrose 50% Injectable 12.5 Gram(s) IV Push once  dextrose 50% Injectable 25 Gram(s) IV Push once  dextrose 50% Injectable 25 Gram(s) IV Push once  ergocalciferol 94520 Unit(s) Oral every week  heparin  Injectable 5000 Unit(s) SubCutaneous every 8 hours  insulin lispro (HumaLOG) corrective regimen sliding scale   SubCutaneous three times a day before meals  methyl salicylate 14%/menthol 6% Topical Ointment 1 Application(s) Topical three times a day    MEDICATIONS  (PRN):  acetaminophen   Tablet 650 milliGRAM(s) Oral every 6 hours PRN For Temp greater than 38 C (100.4 F)  acetaminophen   Tablet. 650 milliGRAM(s) Oral every 6 hours PRN Moderate Pain (4 - 6)  dextrose 40% Gel 15 Gram(s) Oral once PRN Blood Glucose LESS THAN 70 milliGRAM(s)/deciliter  glucagon  Injectable 1 milliGRAM(s) IntraMuscular once PRN Glucose LESS THAN 70 milligrams/deciliter

## 2018-07-21 NOTE — PROGRESS NOTE ADULT - ASSESSMENT
58M with PMHx of HTN, DM with multi-drug overdose (CCB/nitrates/glipizide/metformin) now off pressors and hemodynamically stable.       Problem/Plan - 1:  ·  Problem: Drug overdose.  Plan: multi drug overdose   discontinued insulin infusion now that he is off pressors and hemodynamically stable  continue 1:1 observation.   For eventual psych inpatient transfer      Problem/Plan - 2:  ·  Problem: Fever.  Plan: Fever resolved. Follow up blood and urine cultures.     Problem/Plan - 3:  ·  Problem: Altered mental status.  Plan: secondary to shock and drug OD, now resolve. Awake alert conversant.   ongoing neuro checks.      Problem/Plan - 4:  ·  Problem: Shock.  Plan: now off pressors since shock state is resolved  monitor urine output closely (goal >0.5cc/kg/hr)  will continue to hold all anti-HTN for now  AG has closed, lactate normal.      Problem/Plan - 5:  ·  Problem: MIRZA (acute kidney injury).  Plan: Resolved. Tolerating po food and fluid   remains with appropriate urine output    Problem/Plan - 6:  Problem: headache - Plan : To obtain a brain CT      Problem/Plan - 7:  ·  Problem: DM (diabetes mellitus).  Plan:   Meal time tid insulin coverage .      Problem/Plan - 8:  Problem: Hypokalemia. Plan: Resolved   following  BMP

## 2018-07-22 LAB
ANION GAP SERPL CALC-SCNC: 11 MMOL/L — SIGNIFICANT CHANGE UP (ref 5–17)
BUN SERPL-MCNC: 20 MG/DL — SIGNIFICANT CHANGE UP (ref 8–20)
CALCIUM SERPL-MCNC: 9 MG/DL — SIGNIFICANT CHANGE UP (ref 8.6–10.2)
CHLORIDE SERPL-SCNC: 100 MMOL/L — SIGNIFICANT CHANGE UP (ref 98–107)
CO2 SERPL-SCNC: 25 MMOL/L — SIGNIFICANT CHANGE UP (ref 22–29)
CREAT SERPL-MCNC: 0.99 MG/DL — SIGNIFICANT CHANGE UP (ref 0.5–1.3)
GLUCOSE BLDC GLUCOMTR-MCNC: 193 MG/DL — HIGH (ref 70–99)
GLUCOSE BLDC GLUCOMTR-MCNC: 207 MG/DL — HIGH (ref 70–99)
GLUCOSE BLDC GLUCOMTR-MCNC: 230 MG/DL — HIGH (ref 70–99)
GLUCOSE BLDC GLUCOMTR-MCNC: 246 MG/DL — HIGH (ref 70–99)
GLUCOSE SERPL-MCNC: 254 MG/DL — HIGH (ref 70–115)
HCT VFR BLD CALC: 42.7 % — SIGNIFICANT CHANGE UP (ref 42–52)
HGB BLD-MCNC: 15.1 G/DL — SIGNIFICANT CHANGE UP (ref 14–18)
MCHC RBC-ENTMCNC: 29.6 PG — SIGNIFICANT CHANGE UP (ref 27–31)
MCHC RBC-ENTMCNC: 35.4 G/DL — SIGNIFICANT CHANGE UP (ref 32–36)
MCV RBC AUTO: 83.7 FL — SIGNIFICANT CHANGE UP (ref 80–94)
PLATELET # BLD AUTO: 139 K/UL — LOW (ref 150–400)
POTASSIUM SERPL-MCNC: 3.8 MMOL/L — SIGNIFICANT CHANGE UP (ref 3.5–5.3)
POTASSIUM SERPL-SCNC: 3.8 MMOL/L — SIGNIFICANT CHANGE UP (ref 3.5–5.3)
RBC # BLD: 5.1 M/UL — SIGNIFICANT CHANGE UP (ref 4.6–6.2)
RBC # FLD: 12.5 % — SIGNIFICANT CHANGE UP (ref 11–15.6)
SODIUM SERPL-SCNC: 136 MMOL/L — SIGNIFICANT CHANGE UP (ref 135–145)
WBC # BLD: 6.4 K/UL — SIGNIFICANT CHANGE UP (ref 4.8–10.8)
WBC # FLD AUTO: 6.4 K/UL — SIGNIFICANT CHANGE UP (ref 4.8–10.8)

## 2018-07-22 PROCEDURE — 99233 SBSQ HOSP IP/OBS HIGH 50: CPT

## 2018-07-22 RX ADMIN — MENTHOL AND METHYL SALICYLATE 1 APPLICATION(S): 10; 30 STICK TOPICAL at 21:29

## 2018-07-22 RX ADMIN — HEPARIN SODIUM 5000 UNIT(S): 5000 INJECTION INTRAVENOUS; SUBCUTANEOUS at 21:29

## 2018-07-22 RX ADMIN — Medication 2: at 08:07

## 2018-07-22 RX ADMIN — Medication 81 MILLIGRAM(S): at 14:46

## 2018-07-22 RX ADMIN — MENTHOL AND METHYL SALICYLATE 1 APPLICATION(S): 10; 30 STICK TOPICAL at 14:46

## 2018-07-22 RX ADMIN — Medication 2: at 14:51

## 2018-07-22 RX ADMIN — HEPARIN SODIUM 5000 UNIT(S): 5000 INJECTION INTRAVENOUS; SUBCUTANEOUS at 14:46

## 2018-07-22 RX ADMIN — HEPARIN SODIUM 5000 UNIT(S): 5000 INJECTION INTRAVENOUS; SUBCUTANEOUS at 06:22

## 2018-07-22 RX ADMIN — ATORVASTATIN CALCIUM 20 MILLIGRAM(S): 80 TABLET, FILM COATED ORAL at 21:29

## 2018-07-22 RX ADMIN — MENTHOL AND METHYL SALICYLATE 1 APPLICATION(S): 10; 30 STICK TOPICAL at 06:22

## 2018-07-22 RX ADMIN — Medication 2: at 17:29

## 2018-07-22 NOTE — PROGRESS NOTE ADULT - SUBJECTIVE AND OBJECTIVE BOX
CC: OD on multiple medication . Major depression. Headache associated with cough is resolving. Brain CT showing no evident pathology.   HPI:  57 y/o M with a h/o HTN, DM, presents to ED after taking a handful of about 50 pills (amlodipine, Imdur, metformin). He did this after having an argument with his wife- she reports that he told her that she won't have to see him anymore and he began slurring his speech. In the ED the patient was noted to be hypotensive (SBP: 70's). HR stable. He was given 2L IVF bolus, IV CaCl, glucagon, a 1u/kg insulin bolus followed by a 100u/hr insulin infusion, and ultimately started on a Levophed infusion. Lactate: 5.7. The toxicology team @ [facility] was consulted. The patient is A&Ox3 and is requesting to see his wife and daughter. He denies further suicidal ideation. His wife reports that ever since the patient's father  about 1 year ago he has been upset, but has never done anything like this before. [facility] has not been formally diagnosed with any psychiatric issues. (2018 02:44)    REVIEW OF SYSTEMS:    Patient denied fever, chills, abdominal pain, nausea, vomiting, cough, shortness of breath, chest pain or palpitations    Vital Signs Last 24 Hrs  T(C): 37 (2018 07:30), Max: 37 (2018 07:30)  T(F): 98.6 (2018 07:30), Max: 98.6 (2018 07:30)  HR: 76 (2018 07:30) (76 - 87)  BP: 136/84 (2018 07:30) (118/72 - 136/84)  BP(mean): --  RR: 18 (2018 07:30) (18 - 18)  SpO2: 97% (2018 07:30) (97% - 98%)I&O's Summary    PHYSICAL EXAM:  GENERAL: NAD,   HEENT: PERRL, +EOMI, anicteric, no [place]  NECK: Supple, No JVD   CHEST/LUNG: CTA bilaterally; Normal effort  HEART: S1S2 Normal intensity, no murmurs, gallops or rubs noted  ABDOMEN: Soft, BS Normoactive, NT, ND, no HSM noted  EXTREMITIES:  2+ radial and DP pulses noted, no clubbing, cyanosis, or edema noted, FROM x 4  SKIN: No rashes or lesions noted  NEURO: A&Ox3, no focal deficits noted, CN II-XII intact  PSYCH: Depressed mood and affect; insight/judgement appropriate  LABS:                        15.1   6.4   )-----------( 139      ( 2018 07:01 )             42.7     07-    136  |  100  |  20.0  ----------------------------<  254<H>  3.8   |  25.0  |  0.99    Ca    9.0      2018 07:01    TPro  7.4  /  Alb  3.6  /  TBili  0.9  /  DBili  x   /  AST  22  /  ALT  34  /  AlkPhos  56  07-21        RADIOLOGY & ADDITIONAL TESTS:    MEDICATIONS:  MEDICATIONS  (STANDING):  aspirin enteric coated 81 milliGRAM(s) Oral daily  atorvastatin 20 milliGRAM(s) Oral at bedtime  dextrose 5%. 1000 milliLiter(s) (50 mL/Hr) IV Continuous <Continuous>  dextrose 50% Injectable 12.5 Gram(s) IV Push once  dextrose 50% Injectable 25 Gram(s) IV Push once  dextrose 50% Injectable 25 Gram(s) IV Push once  ergocalciferol 68531 Unit(s) Oral every week  heparin  Injectable 5000 Unit(s) SubCutaneous every 8 hours  insulin lispro (HumaLOG) corrective regimen sliding scale   SubCutaneous three times a day before meals  methyl salicylate 14%/menthol 6% Topical Ointment 1 Application(s) Topical three times a day    MEDICATIONS  (PRN):  acetaminophen   Tablet 650 milliGRAM(s) Oral every 6 hours PRN For Temp greater than 38 C (100.4 F)  acetaminophen   Tablet. 650 milliGRAM(s) Oral every 6 hours PRN Moderate Pain (4 - 6)  dextrose 40% Gel 15 Gram(s) Oral once PRN Blood Glucose LESS THAN 70 milliGRAM(s)/deciliter  glucagon  Injectable 1 milliGRAM(s) IntraMuscular once PRN Glucose LESS THAN 70 milligrams/deciliter

## 2018-07-22 NOTE — PROGRESS NOTE ADULT - ASSESSMENT
58M with PMHx of HTN, DM with multi-drug overdose (CCB/nitrates/glipizide/metformin) now off pressors and hemodynamically stable.     Problem/Plan - 1:  ·  Problem: Drug overdose.  Plan: multi drug overdose   discontinued insulin infusion now that he is off pressors and hemodynamically stable  continue 1:1 observation.   Patient is medically optimized for transfer to inpatient psych unit.   For psych inpatient transfer when approved by psychiatrist.      Problem/Plan - 2:  ·  Problem: Fever.  Plan: Fever resolved.  blood cx no growth.      Problem/Plan - 3:  ·  Problem: Altered mental status.  Plan: secondary to shock and drug OD, now resolve. Awake alert conversant.   ongoing neuro checks.      Problem/Plan - 4:  ·  Problem: Shock.  Plan: now off pressors since shock state is resolved  Adequate urine output.  AG has closed, lactate normal.    will continue to hold all anti-HTN for prevailing normotension       Problem/Plan - 5:  ·  Problem: MIRZA (acute kidney injury).  Plan: Resolved. Tolerating po food and fluid   remains with appropriate urine output    Problem/Plan - 6:  Problem: headache - Plan :  brain CT no evident pathology.       Problem/Plan - 7:  ·  Problem: DM (diabetes mellitus).  Plan:   Meal time tid insulin coverage .      Problem/Plan - 8:  Problem: Hypokalemia. Plan: Resolved

## 2018-07-22 NOTE — PROGRESS NOTE ADULT - ATTENDING COMMENTS
Patient is medically optimized for transfer to psychiatric inpatient facility.
To obtain and review a brain CT for continuing headache mostly associated with coughing.
Remove central line, continue 1:1 observation. Pt is stable to transfer out of ICU, care endorsed to Dr. Turcios. Pt will need inpatient psychiatric treatment once medically clear.

## 2018-07-23 DIAGNOSIS — F32.2 MAJOR DEPRESSIVE DISORDER, SINGLE EPISODE, SEVERE WITHOUT PSYCHOTIC FEATURES: ICD-10-CM

## 2018-07-23 LAB
GLUCOSE BLDC GLUCOMTR-MCNC: 202 MG/DL — HIGH (ref 70–99)
GLUCOSE BLDC GLUCOMTR-MCNC: 217 MG/DL — HIGH (ref 70–99)
GLUCOSE BLDC GLUCOMTR-MCNC: 257 MG/DL — HIGH (ref 70–99)
GLUCOSE BLDC GLUCOMTR-MCNC: 293 MG/DL — HIGH (ref 70–99)

## 2018-07-23 PROCEDURE — 99232 SBSQ HOSP IP/OBS MODERATE 35: CPT

## 2018-07-23 RX ADMIN — MENTHOL AND METHYL SALICYLATE 1 APPLICATION(S): 10; 30 STICK TOPICAL at 21:30

## 2018-07-23 RX ADMIN — MENTHOL AND METHYL SALICYLATE 1 APPLICATION(S): 10; 30 STICK TOPICAL at 14:06

## 2018-07-23 RX ADMIN — Medication 2: at 08:46

## 2018-07-23 RX ADMIN — Medication 2: at 12:24

## 2018-07-23 RX ADMIN — HEPARIN SODIUM 5000 UNIT(S): 5000 INJECTION INTRAVENOUS; SUBCUTANEOUS at 21:31

## 2018-07-23 RX ADMIN — HEPARIN SODIUM 5000 UNIT(S): 5000 INJECTION INTRAVENOUS; SUBCUTANEOUS at 14:06

## 2018-07-23 RX ADMIN — Medication 3: at 17:39

## 2018-07-23 RX ADMIN — MENTHOL AND METHYL SALICYLATE 1 APPLICATION(S): 10; 30 STICK TOPICAL at 05:48

## 2018-07-23 RX ADMIN — ATORVASTATIN CALCIUM 20 MILLIGRAM(S): 80 TABLET, FILM COATED ORAL at 21:31

## 2018-07-23 RX ADMIN — Medication 81 MILLIGRAM(S): at 12:24

## 2018-07-23 RX ADMIN — HEPARIN SODIUM 5000 UNIT(S): 5000 INJECTION INTRAVENOUS; SUBCUTANEOUS at 05:49

## 2018-07-23 NOTE — PROGRESS NOTE BEHAVIORAL HEALTH - SUMMARY
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  He is currently being treated in SICU. Asked to evaluate depressive sx's and possible suicidal ideation.  Patient reports stress related to impotence last 10-15 years,  and mourning death of his father 3 years ago. However, he was in usual state of health until 1 week ago when wife discovered sexually explicit pictures and videos that a woman had been sending him on his phone.  Patient reports that he has been having surreptitious communication with a woman for the last two years for sexual stimulation but that he had no interest in meeting person.  Prior to presentation he had argument with wife where she stated she was going to divorce him and leave.  Patient developed suicidal ideation and took around 50 pills of imdur, metformin and amlodipine as a suicide attempt.  No psychotic or manic sx's were elicited.  Patient reports one week of depressed mood prior to incident.  He denied any other neurovegetative signs of depression.  He remained engaged in work, although had not been very communicative with wife.  Patient currently denies any S/H I/I/P, and remains future oriented.   Patient is guarded and minimizing suicide attempt. Collateral received from family supports patient is a danger to self and when medically cleared will require transfer to inpatient psychiatry for safety and mood stabilization.
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  patient s/p SICU. Asked to evaluate depressive sx's and possible suicidal ideation.  Patient reported stress related to impotence last 10-15 years,  and mourning death of his father 3 years ago. However, he was in usual state of health until 1 week ago when wife discovered sexually explicit pictures and videos that a woman had been sending him on his phone.  Patient reports that he has been having surreptitious communication with a woman for the last two years for sexual stimulation but that he had no interest in meeting person.  Prior to presentation he had argument with wife where she stated she was going to divorce him and leave.  Patient developed suicidal ideation and took around 50 pills of imdur, metformin and amlodipine as a suicide attempt.  No psychotic or manic sx's were elicited.  Patient reports one week of depressed mood prior to incident.  He denied any other neurovegetative signs of depression.  He remained engaged in work, although had not been very communicative with wife.  Patient currently denies any S/H I/I/P, and remains future oriented although at this time not realistic about relationship with wife.    Patient is guarded and minimizing suicide attempt. Collateral received from family supports patient is a danger to self and when medically cleared will require transfer to inpatient psychiatry for safety and mood stabilization. [facility]
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  patient s/p SICU. Asked to evaluate depressive sx's and possible suicidal ideation.  Patient reported stress related to impotence last 10-15 years,  and mourning death of his father 3 years ago. However, he was in usual state of health until 1 week ago when wife discovered sexually explicit pictures and videos that a woman had been sending him on his phone.  Patient reports that he has been having surreptitious communication with a woman for the last two years for sexual stimulation but that he had no interest in meeting person.  Prior to presentation he had argument with wife where she stated she was going to divorce him and leave.  Patient developed suicidal ideation and took around 50 pills of imdur, metformin and amlodipine as a suicide attempt.  No psychotic or manic sx's were elicited.  Patient reports one week of depressed mood prior to incident.  He denied any other neurovegetative signs of depression.  He remained engaged in work, although had not been very communicative with wife.  Patient currently denies any S/H I/I/P, and remains future oriented.   Patient is guarded and minimizing suicide attempt. Collateral received from family supports patient is a danger to self and when medically cleared will require transfer to inpatient psychiatry for safety and mood stabilization.

## 2018-07-23 NOTE — PROGRESS NOTE BEHAVIORAL HEALTH - NSBHATTESTSEENBY_PSY_A_CORE
NP with telephonic supervision from Attending Psychiatrist
attending Psychiatrist without NP/Trainee
NP with telephonic supervision from Attending Psychiatrist

## 2018-07-23 NOTE — PROGRESS NOTE ADULT - SUBJECTIVE AND OBJECTIVE BOX
CHIEF COMPLAINT/INTERVAL HISTORY:  57 y/o M with a h/o HTN, DM, presents to ED after taking a handful of about 50 pills (amlodipine, Imdur, metformin). He did this after having an argument with his wife- she reports that he told her that she won't have to see him anymore and he began slurring his speech. In the ED the patient was noted to be hypotensive (SBP: 70's). HR stable. He was given 2L IVF bolus, IV CaCl, glucagon, a 1u/kg insulin bolus followed by a 100u/hr insulin infusion, and ultimately started on a Levophed infusion. Lactate: 5.7. The toxicology team @ [facility] was consulted. The patient is A&Ox3 and is requesting to see his wife and daughter. He denies further suicidal ideation. His wife reports that ever since the patient's father  about 1 year ago he has been upset, but has never done anything like this before. [facility] not been formally diagnosed with any psychiatric issues. (2018 02:44)    Pt. seen and evaluated for drug overdose.  Pt. is in no distress.  On 1:1 observation.  Offers no new concerns.      REVIEW OF SYSTEMS:  No fever, CP, SOB, or abdominal pain .      Vital Signs Last 24 Hrs  T(C): 36.8 (2018 09:00), Max: 36.9 (2018 15:15)  T(F): 98.2 (2018 09:00), Max: 98.5 (2018 15:15)  HR: 77 (2018 09:00) (77 - 81)  BP: 138/83 (2018 09:00) (117/67 - 141/93)  BP(mean): --  RR: 18 (2018 09:00) (18 - 19)  SpO2: 98% (2018 09:00) (96% - 98%)    PHYSICAL EXAM:  GENERAL: NAD  HEENT: EOMI, hearing normal, conjunctiva and sclera clear  Chest: CTA bilaterally, no wheezing  CV: S1S2, RRR,   GI: soft, +BS, NT/ND  Musculoskeletal: no edema  Psychiatric: affect nL, mood nL  Skin: warm and dry    LABS:                        15.1   6.4   )-----------( 139      ( 2018 07:01 )             42.7     07-    136  |  100  |  20.0  ----------------------------<  254<H>  3.8   |  25.0  |  0.99    Ca    9.0      2018 07:01    Assessment and Plan:  58M with PMHx of HTN, DM with multi-drug overdose (CCB/nitrates/glipizide/metformin) now off pressors and hemodynamically stable.  -Multi drug overdose:  Continue 1:1 observation.  Awaiting transfer to inpatient psych.    -Fever:  Resolved.  Blood cx, urine cx, and CXR negative   -Altered mental status:  2/2 drug overdose.  Now alert and conversive.  -Shock:  resolved.  Off pressors.  Holding antihypertensives.  -MIRZA:  resolved and tolerating PO intake.   -Headache:  CT brain with no acute pathology  -DM:  continue humalog sliding scale  -HLD:  continue statin therapy  -VTE ppx:  heparin 5000 units SQ Q8h

## 2018-07-23 NOTE — PROGRESS NOTE BEHAVIORAL HEALTH - RISK ASSESSMENT
high-Given recent serious suicide attempt, ongoing stressor with wife, depressed mood, not currently in treatment and recent statement made to family.
high-Given recent serious suicide attempt, ongoing stressor with wife, depressed mood, not currently in treatment and recent statement made to family, unrealistic outlook currently
high-Given recent serious suicide attempt, ongoing stressor with wife, depressed mood, not currently in treatment and recent statement made to family.

## 2018-07-23 NOTE — PROGRESS NOTE BEHAVIORAL HEALTH - NSBHFUPINTERVALHXFT_PSY_A_CORE
Patient remains in ICU on constant observation and family at bedside. Patient is A&Ox3, mood depressed, denies current suicidal ideation, intent or plan. Minimizing suicide attempt. Reports long h/o depression which started as a child. When asked if he was currently depressed stated, " wouldn't you be?"   Received collateral from spouse Adriana who is not sure if she will be returning home stating, " I need to work on myself abd I don't know how much support I can give." Adriana reports confronting her about his communication with a woman in [facility] on 2018. Tuesday Adriana decided to leave the house and attempted to drive to a hotel. She returned home due to problems with her car. When she returned home she noticed patient was slurring and appeared " out of it." Patient denies taking any medication however, confessed to taking several bottles of different medication once she called 911. She reports going on line and found her  did a google search regarding " how to kill yourself with cardiac medication."   Received additional collateral from daughter Chaya [phone], who was also at bedside. Chaya reports yesterday her father stated, " I wish I had . I wish I were dead." Patient made theses comments twice and then later denied. Today patient told his daughter " If anything happens to me I want everything to go to your mother." Patient also admitted he had a suicide attempt as a child and thinks of suicide at least 2x/year. Radha feels her father is a high suicide risk and requires inpatient psychiatric hospitalization. Radha reports her father has appeared depressed for the past year and has not been engaged with the family. Daughter reports patient is not always truthful and feels he will say what it takes to go home.
Patient continues to be depressed.  He was sitting in chair, cooperative with interview.  States that he wants to understand the root of his problems.  [facility]  He was vague about current state of relationship with wife.  Denies active S/H I/I/P.  Spoke with wife who feels that patient is not taking ownership for his actions. She is not ready to reconcile with him and has high concerns about his safety particularly since she is not going to be in town for several days.  Spoke to daughter Chaya who has been visiting every day and corroborated again that patient made suicidal statements to her while in the hospital after the overdose.  She is concerned that patient is not have realistic outlook about relationship with her mother (pt's wife).  He has also made statements to her that if anything should happen that everything should go to his wife.  She feels that patient is able to mask feelings towards others and is afraid that he would repeat his suicidal attempt if he were discharged home.
Patient admits to self reflection that he suspects something is wrong with his "personality" causing him to have extreme reactions to stressful events, and states he feels he needs treatment for this. He admits that he did want to die at time of overdose but states at this time he has changed his mind, sees family as reason to stay alive. he reports difficulty sleeping in hospital environment.

## 2018-07-23 NOTE — PROGRESS NOTE BEHAVIORAL HEALTH - NSBHFUPINTERVALCCFT_PSY_A_CORE
I want to find out why I act the way I act."
"Feeling ok...[facility]."
I want to find out why I act the way I act."

## 2018-07-23 NOTE — PROGRESS NOTE BEHAVIORAL HEALTH - NSBHCONSULTFOLLOWDETAILS_PSY_A_CORE FT
Requires inpatient psychiatric hospitalization once medically cleared.
Requires inpatient psychiatric hospitalization once medically cleared. Spoke with SW about [facility].
Requires inpatient psychiatric hospitalization once medically cleared.

## 2018-07-23 NOTE — PROGRESS NOTE BEHAVIORAL HEALTH - NSBHCHARTREVIEWLAB_PSY_A_CORE FT
11.7   10.4  )-----------( 107      ( 19 Jul 2018 06:22 )             34.4     07-19    142  |  108<H>  |  12.0  ----------------------------<  98  4.2   |  22.0  |  1.09    Ca    7.9<L>      19 Jul 2018 06:18  Phos  4.0     07-19  Mg     2.4     07-19    TPro  5.8<L>  /  Alb  3.4  /  TBili  1.2  /  DBili  x   /  AST  35  /  ALT  34  /  AlkPhos  40  07-19    LIVER FUNCTIONS - ( 19 Jul 2018 06:18 )  Alb: 3.4 g/dL / Pro: 5.8 g/dL / ALK PHOS: 40 U/L / ALT: 34 U/L / AST: 35 U/L / GGT: x           PT/INR - ( 18 Jul 2018 05:58 )   PT: 12.7 sec;   INR: 1.15 ratio
15.1   6.4   )-----------( 139      ( 22 Jul 2018 07:01 )             42.7     07-22    136  |  100  |  20.0  ----------------------------<  254<H>  3.8   |  25.0  |  0.99    Ca    9.0      22 Jul 2018 07:01
15.8   7.3   )-----------( 129      ( 2018 07:15 )             45.0     07-21    139  |  98  |  15.0  ----------------------------<  201<H>  3.8   |  25.0  |  0.92    Ca    9.3      2018 07:15  Mg     1.9     07-20    TPro  7.4  /  Alb  3.6  /  TBili  0.9  /  DBili  x   /  AST  22  /  ALT  34  /  AlkPhos  56  07-21    LIVER FUNCTIONS - ( 2018 07:15 )  Alb: 3.6 g/dL / Pro: 7.4 g/dL / ALK PHOS: 56 U/L / ALT: 34 U/L / AST: 22 U/L / GGT: x             Urinalysis Basic - ( 2018 21:48 )    Color: Yellow / Appearance: Clear / S.010 / pH: x  Gluc: x / Ketone: Negative  / Bili: Negative / Urobili: Negative mg/dL   Blood: x / Protein: Negative mg/dL / Nitrite: Negative   Leuk Esterase: Negative / RBC: 3-5 /HPF / WBC 0-2   Sq Epi: x / Non Sq Epi: Occasional / Bacteria: Occasional             PT/INR - ( 2018 05:58 )   PT: 12.7 sec;   INR: 1.15 ratio

## 2018-07-23 NOTE — PROGRESS NOTE BEHAVIORAL HEALTH - PRIMARY DX
Current severe episode of major depressive disorder without psychotic features, unspecified whether recurrent

## 2018-07-24 VITALS — HEART RATE: 82 BPM | DIASTOLIC BLOOD PRESSURE: 86 MMHG | TEMPERATURE: 99 F | SYSTOLIC BLOOD PRESSURE: 145 MMHG

## 2018-07-24 LAB
GLUCOSE BLDC GLUCOMTR-MCNC: 175 MG/DL — HIGH (ref 70–99)
GLUCOSE BLDC GLUCOMTR-MCNC: 193 MG/DL — HIGH (ref 70–99)
GLUCOSE BLDC GLUCOMTR-MCNC: 203 MG/DL — HIGH (ref 70–99)

## 2018-07-24 PROCEDURE — 82553 CREATINE MB FRACTION: CPT

## 2018-07-24 PROCEDURE — 96374 THER/PROPH/DIAG INJ IV PUSH: CPT | Mod: XU

## 2018-07-24 PROCEDURE — 81001 URINALYSIS AUTO W/SCOPE: CPT

## 2018-07-24 PROCEDURE — 82962 GLUCOSE BLOOD TEST: CPT

## 2018-07-24 PROCEDURE — 82947 ASSAY GLUCOSE BLOOD QUANT: CPT

## 2018-07-24 PROCEDURE — 84145 PROCALCITONIN (PCT): CPT

## 2018-07-24 PROCEDURE — 85027 COMPLETE CBC AUTOMATED: CPT

## 2018-07-24 PROCEDURE — 82330 ASSAY OF CALCIUM: CPT

## 2018-07-24 PROCEDURE — 71045 X-RAY EXAM CHEST 1 VIEW: CPT

## 2018-07-24 PROCEDURE — 84100 ASSAY OF PHOSPHORUS: CPT

## 2018-07-24 PROCEDURE — 82803 BLOOD GASES ANY COMBINATION: CPT

## 2018-07-24 PROCEDURE — 99292 CRITICAL CARE ADDL 30 MIN: CPT | Mod: 25

## 2018-07-24 PROCEDURE — 36415 COLL VENOUS BLD VENIPUNCTURE: CPT

## 2018-07-24 PROCEDURE — 80048 BASIC METABOLIC PNL TOTAL CA: CPT

## 2018-07-24 PROCEDURE — 99291 CRITICAL CARE FIRST HOUR: CPT | Mod: 25

## 2018-07-24 PROCEDURE — 84132 ASSAY OF SERUM POTASSIUM: CPT

## 2018-07-24 PROCEDURE — 80053 COMPREHEN METABOLIC PANEL: CPT

## 2018-07-24 PROCEDURE — 93005 ELECTROCARDIOGRAM TRACING: CPT

## 2018-07-24 PROCEDURE — 84295 ASSAY OF SERUM SODIUM: CPT

## 2018-07-24 PROCEDURE — 83605 ASSAY OF LACTIC ACID: CPT

## 2018-07-24 PROCEDURE — 84443 ASSAY THYROID STIM HORMONE: CPT

## 2018-07-24 PROCEDURE — 82435 ASSAY OF BLOOD CHLORIDE: CPT

## 2018-07-24 PROCEDURE — 99239 HOSP IP/OBS DSCHRG MGMT >30: CPT

## 2018-07-24 PROCEDURE — 70450 CT HEAD/BRAIN W/O DYE: CPT

## 2018-07-24 PROCEDURE — 62270 DX LMBR SPI PNXR: CPT

## 2018-07-24 PROCEDURE — 87040 BLOOD CULTURE FOR BACTERIA: CPT

## 2018-07-24 PROCEDURE — 80307 DRUG TEST PRSMV CHEM ANLYZR: CPT

## 2018-07-24 PROCEDURE — 83036 HEMOGLOBIN GLYCOSYLATED A1C: CPT

## 2018-07-24 PROCEDURE — 83735 ASSAY OF MAGNESIUM: CPT

## 2018-07-24 PROCEDURE — 87086 URINE CULTURE/COLONY COUNT: CPT

## 2018-07-24 PROCEDURE — 85014 HEMATOCRIT: CPT

## 2018-07-24 PROCEDURE — 85610 PROTHROMBIN TIME: CPT

## 2018-07-24 PROCEDURE — 93010 ELECTROCARDIOGRAM REPORT: CPT

## 2018-07-24 PROCEDURE — 82550 ASSAY OF CK (CPK): CPT

## 2018-07-24 PROCEDURE — C1751: CPT

## 2018-07-24 PROCEDURE — 36555 INSERT NON-TUNNEL CV CATH: CPT

## 2018-07-24 PROCEDURE — 96375 TX/PRO/DX INJ NEW DRUG ADDON: CPT | Mod: XU

## 2018-07-24 PROCEDURE — 36600 WITHDRAWAL OF ARTERIAL BLOOD: CPT

## 2018-07-24 RX ORDER — LISINOPRIL 2.5 MG/1
1 TABLET ORAL
Qty: 0 | Refills: 0 | COMMUNITY

## 2018-07-24 RX ADMIN — HEPARIN SODIUM 5000 UNIT(S): 5000 INJECTION INTRAVENOUS; SUBCUTANEOUS at 14:06

## 2018-07-24 RX ADMIN — HEPARIN SODIUM 5000 UNIT(S): 5000 INJECTION INTRAVENOUS; SUBCUTANEOUS at 05:25

## 2018-07-24 RX ADMIN — MENTHOL AND METHYL SALICYLATE 1 APPLICATION(S): 10; 30 STICK TOPICAL at 14:06

## 2018-07-24 RX ADMIN — Medication 1: at 12:45

## 2018-07-24 RX ADMIN — Medication 1: at 17:13

## 2018-07-24 RX ADMIN — Medication 81 MILLIGRAM(S): at 12:46

## 2018-07-24 RX ADMIN — Medication 2: at 08:21

## 2018-07-24 NOTE — DISCHARGE NOTE ADULT - PLAN OF CARE
To avoid future attempts at intentional overdose/suicide attempts Follow up with psychiatry at inpatient psychiatry, activity as tolerable, low salt/low cholesterol/limited carbohydrate diet.  Pt. is medically stable to be transferred to inpatient psychiatry unit. resume oral hypoglycemic medications you have been taken of lisinopril 40mg oral daily Monitor blood pressure (has been stable in the hospital)

## 2018-07-24 NOTE — DISCHARGE NOTE ADULT - PATIENT PORTAL LINK FT
You can access the [facility] Portal, offered by [facility], by registering with the following website: [email or web address]

## 2018-07-24 NOTE — DISCHARGE NOTE ADULT - MEDICATION SUMMARY - MEDICATIONS TO TAKE
I will START or STAY ON the medications listed below when I get home from the hospital:    Aspirin Enteric Coated 81 mg oral delayed release tablet  -- 1 tab(s) by mouth once a day  -- Indication: For primary cardiac prevention    isosorbide mononitrate  -- 30 milligram(s) by mouth once a day  -- Indication: For Home medication    glipiZIDE 10 mg oral tablet  -- 1 tab(s) by mouth once a day  -- Indication: For DM (diabetes mellitus)    metFORMIN 500 mg oral tablet  -- take 3x tabs in morning and take 2x tabs in evening  -- Indication: For DM (diabetes mellitus)    atorvastatin 20 mg oral tablet  -- 1 tab(s) by mouth once a day  -- Indication: For Hyperlipidemia    ergocalciferol 50,000 intl units (1.25 mg) oral capsule  -- 1 cap(s) by mouth once a week  -- Indication: For Supplement

## 2018-07-24 NOTE — PROGRESS NOTE ADULT - SUBJECTIVE AND OBJECTIVE BOX
CHIEF COMPLAINT/INTERVAL HISTORY:  Pt. seen and evaluated for intentional drug overdose.  Pt. is in no distress.  Offers no new concerns.  Hemodynamically stable.     REVIEW OF SYSTEMS:  No fever, CP, SOB, or abdominal pain.      Vital Signs Last 24 Hrs  T(C): 36.8 (24 Jul 2018 08:00), Max: 37.2 (23 Jul 2018 15:46)  T(F): 98.2 (24 Jul 2018 08:00), Max: 99 (23 Jul 2018 23:55)  HR: 77 (24 Jul 2018 08:00) (77 - 85)  BP: 142/88 (24 Jul 2018 08:00) (125/70 - 142/88)  BP(mean): --  RR: 18 (24 Jul 2018 08:00) (18 - 18)  SpO2: 98% (24 Jul 2018 08:00) (98% - 99%)    PHYSICAL EXAM:  GENERAL: NAD  HEENT: EOMI, hearing normal, conjunctiva and sclera clear  Chest: CTA bilaterally, no wheezing  CV: S1S2, RRR,   GI: soft, +BS, NT/ND  Musculoskeletal: no edema  Psychiatric: affect nL, mood nL  Skin: warm and dry    Assessment and Plan:   58M with PMHx of HTN, DM with multi-drug overdose (CCB/nitrates/glipizide/metformin) now off pressors and hemodynamically stable.  -Multi drug overdose:  Continue 1:1 observation.  Awaiting transfer to inpatient psych.    -Fever:  Resolved.  Blood cx, urine cx, and CXR negative   -Altered mental status:  2/2 drug overdose.  Now alert and conversive.  -Shock:  resolved.  Off pressors.  Holding antihypertensives.  -MIRZA:  resolved and tolerating PO intake.   -Headache:  CT brain with no acute pathology  -DM:  continue humalog sliding scale  -HLD:  continue statin therapy  -VTE ppx:  heparin 5000 units SQ Q8h

## 2018-07-24 NOTE — DISCHARGE NOTE ADULT - CARE PLAN
Principal Discharge DX:	Drug overdose  Goal:	To avoid future attempts at intentional overdose/suicide attempts  Assessment and plan of treatment:	Follow up with psychiatry at inpatient psychiatry, activity as tolerable, low salt/low cholesterol/limited carbohydrate diet.  Pt. is medically stable to be transferred to inpatient psychiatry unit.  Secondary Diagnosis:	DM (diabetes mellitus)  Goal:	resume oral hypoglycemic medications  Secondary Diagnosis:	HTN (hypertension)  Goal:	you have been taken of lisinopril 40mg oral daily  Assessment and plan of treatment:	Monitor blood pressure (has been stable in the hospital)

## 2018-07-24 NOTE — DIETITIAN INITIAL EVALUATION ADULT. - OTHER INFO
Pt admitted for multi-drug overdose. Pt with hx of T2DM. Pt states his weight fluctuates a few pounds at times but not drastically. Pt with good appetite and eating well at meals. Pt states he tries to follow a consistent CHO diet at home. Pt states his A1c increased and admitted to needing to be more strict with recommendations. Educated pt briefly on Meal Planning with the plate model. literature provided.

## 2018-07-24 NOTE — PROGRESS NOTE ADULT - PROVIDER SPECIALTY LIST ADULT
Critical Care
Critical Care
Hospitalist
Nephrology
Critical Care
Hospitalist
Nephrology

## 2018-07-24 NOTE — DISCHARGE NOTE ADULT - HOSPITAL COURSE
57 y/o M with a h/o HTN, DM, presents to ED after taking a handful of about 50 pills (amlodipine, Imdur, metformin). He did this after having an argument with his wife- she reports that he told her that she won't have to see him anymore and he began slurring his speech. In the ED the patient was noted to be hypotensive (SBP: 70's). HR stable. He was given 2L IVF bolus, IV CaCl, glucagon, a 1u/kg insulin bolus followed by a 100u/hr insulin infusion, and ultimately started on a Levophed infusion. Lactate: 5.7. The toxicology team @ [facility] was consulted. The patient is A&Ox3 and is requesting to see his wife and daughter. He denies further suicidal ideation. His wife reports that ever since the patient's father  about 1 year ago he has been upset, but has never done anything like this before. [facility] but has not been formally diagnosed with any psychiatric issues.     Pt. clinically improved.  BP stabilized and patient taken off pressors.  Hypoglycemia resolved.  Urine and blood cx were negative.  Pt. was seen by nephrology for MIRZA which has resolved with aggressive hydration.  Pt. was assessed by psychiatry and recommended discharge to inpatient psychiatry when medically stable.      On day of discharge patient is afebrile and hemodynamically stable.  Pt. is medically stable for transfer to inpatient psychiatry unit.    Completion of discharge in 35 minutes.

## 2018-07-26 LAB
CULTURE RESULTS: SIGNIFICANT CHANGE UP
SPECIMEN SOURCE: SIGNIFICANT CHANGE UP

## 2023-08-08 NOTE — PROGRESS NOTE BEHAVIORAL HEALTH - NSBHCHARTREVIEWVS_PSY_A_CORE FT
Vital Signs Last 24 Hrs  T(C): 37.9 (19 Jul 2018 12:00), Max: 38.2 (18 Jul 2018 20:00)  T(F): 100.2 (19 Jul 2018 12:00), Max: 100.8 (18 Jul 2018 20:00)  HR: 100 (19 Jul 2018 13:00) (77 - 100)  BP: 125/60 (19 Jul 2018 13:00) (94/51 - 135/66)  BP(mean): 84 (19 Jul 2018 13:00) (54 - 90)  RR: 30 (19 Jul 2018 13:00) (21 - 39)  SpO2: 93% (19 Jul 2018 13:00) (92% - 96%)
Vital Signs Last 24 Hrs  T(C): 37.2 (23 Jul 2018 15:46), Max: 37.2 (23 Jul 2018 15:46)  T(F): 98.9 (23 Jul 2018 15:46), Max: 98.9 (23 Jul 2018 15:46)  HR: 85 (23 Jul 2018 15:46) (77 - 85)  BP: 137/88 (23 Jul 2018 15:46) (118/68 - 141/93)  BP(mean): --  RR: 18 (23 Jul 2018 15:46) (18 - 19)  SpO2: 99% (23 Jul 2018 15:46) (98% - 99%)
Vital Signs Last 24 Hrs  T(C): 37.4 (21 Jul 2018 00:00), Max: 37.4 (21 Jul 2018 00:00)  T(F): 99.4 (21 Jul 2018 00:00), Max: 99.4 (21 Jul 2018 00:00)  HR: 89 (21 Jul 2018 00:00) (89 - 94)  BP: 123/69 (21 Jul 2018 00:00) (123/69 - 129/72)  BP(mean): --  RR: 18 (21 Jul 2018 00:00) (18 - 18)  SpO2: 95% (21 Jul 2018 00:00) (95% - 96%)
Qbrexza Counseling:  I discussed with the patient the risks of Qbrexza including but not limited to headache, mydriasis, blurred vision, dry eyes, nasal dryness, dry mouth, dry throat, dry skin, urinary hesitation, and constipation.  Local skin reactions including erythema, burning, stinging, and itching can also occur.

## 2025-01-22 NOTE — ED ADULT TRIAGE NOTE - NS ED TRIAGE AVPU SCALE
1. Anxiety      Recent visit notes reviewed, and patient stable on current regimen.   Prescription Drug Monitoring Program reviewed; no aberrant behavior identified, prescription authorized.  Med E-prescribed.     Alert-The patient is alert, awake and responds to voice. The patient is oriented to time, place, and person. The triage nurse is able to obtain subjective information.